# Patient Record
Sex: MALE | Race: ASIAN | NOT HISPANIC OR LATINO | URBAN - METROPOLITAN AREA
[De-identification: names, ages, dates, MRNs, and addresses within clinical notes are randomized per-mention and may not be internally consistent; named-entity substitution may affect disease eponyms.]

---

## 2019-10-13 ENCOUNTER — EMERGENCY (EMERGENCY)
Facility: HOSPITAL | Age: 24
LOS: 1 days | Discharge: HOME | End: 2019-10-13
Attending: EMERGENCY MEDICINE | Admitting: EMERGENCY MEDICINE
Payer: COMMERCIAL

## 2019-10-13 VITALS
RESPIRATION RATE: 20 BRPM | SYSTOLIC BLOOD PRESSURE: 165 MMHG | TEMPERATURE: 97 F | OXYGEN SATURATION: 99 % | DIASTOLIC BLOOD PRESSURE: 97 MMHG | HEART RATE: 111 BPM

## 2019-10-13 DIAGNOSIS — F10.20 ALCOHOL DEPENDENCE, UNCOMPLICATED: ICD-10-CM

## 2019-10-13 LAB
ALBUMIN SERPL ELPH-MCNC: 4.7 G/DL — SIGNIFICANT CHANGE UP (ref 3.3–5)
ALP SERPL-CCNC: 88 U/L — SIGNIFICANT CHANGE UP (ref 40–120)
ALT FLD-CCNC: 16 U/L — SIGNIFICANT CHANGE UP (ref 4–41)
ANION GAP SERPL CALC-SCNC: 18 MMO/L — HIGH (ref 7–14)
APAP SERPL-MCNC: < 15 UG/ML — LOW (ref 15–25)
AST SERPL-CCNC: 20 U/L — SIGNIFICANT CHANGE UP (ref 4–40)
BASOPHILS # BLD AUTO: 0.1 K/UL — SIGNIFICANT CHANGE UP (ref 0–0.2)
BASOPHILS NFR BLD AUTO: 0.7 % — SIGNIFICANT CHANGE UP (ref 0–2)
BILIRUB SERPL-MCNC: 0.2 MG/DL — SIGNIFICANT CHANGE UP (ref 0.2–1.2)
BUN SERPL-MCNC: 11 MG/DL — SIGNIFICANT CHANGE UP (ref 7–23)
CALCIUM SERPL-MCNC: 9.5 MG/DL — SIGNIFICANT CHANGE UP (ref 8.4–10.5)
CHLORIDE SERPL-SCNC: 99 MMOL/L — SIGNIFICANT CHANGE UP (ref 98–107)
CO2 SERPL-SCNC: 21 MMOL/L — LOW (ref 22–31)
CREAT SERPL-MCNC: 0.98 MG/DL — SIGNIFICANT CHANGE UP (ref 0.5–1.3)
EOSINOPHIL # BLD AUTO: 0.31 K/UL — SIGNIFICANT CHANGE UP (ref 0–0.5)
EOSINOPHIL NFR BLD AUTO: 2.1 % — SIGNIFICANT CHANGE UP (ref 0–6)
ETHANOL BLD-MCNC: 90 MG/DL — HIGH
GLUCOSE SERPL-MCNC: 96 MG/DL — SIGNIFICANT CHANGE UP (ref 70–99)
HCT VFR BLD CALC: 49.3 % — SIGNIFICANT CHANGE UP (ref 39–50)
HGB BLD-MCNC: 15.4 G/DL — SIGNIFICANT CHANGE UP (ref 13–17)
IMM GRANULOCYTES NFR BLD AUTO: 0.9 % — SIGNIFICANT CHANGE UP (ref 0–1.5)
LYMPHOCYTES # BLD AUTO: 27.2 % — SIGNIFICANT CHANGE UP (ref 13–44)
LYMPHOCYTES # BLD AUTO: 4.11 K/UL — HIGH (ref 1–3.3)
MCHC RBC-ENTMCNC: 27.1 PG — SIGNIFICANT CHANGE UP (ref 27–34)
MCHC RBC-ENTMCNC: 31.2 % — LOW (ref 32–36)
MCV RBC AUTO: 86.6 FL — SIGNIFICANT CHANGE UP (ref 80–100)
MONOCYTES # BLD AUTO: 0.9 K/UL — SIGNIFICANT CHANGE UP (ref 0–0.9)
MONOCYTES NFR BLD AUTO: 6 % — SIGNIFICANT CHANGE UP (ref 2–14)
NEUTROPHILS # BLD AUTO: 9.54 K/UL — HIGH (ref 1.8–7.4)
NEUTROPHILS NFR BLD AUTO: 63.1 % — SIGNIFICANT CHANGE UP (ref 43–77)
NRBC # FLD: 0 K/UL — SIGNIFICANT CHANGE UP (ref 0–0)
PLATELET # BLD AUTO: 316 K/UL — SIGNIFICANT CHANGE UP (ref 150–400)
PMV BLD: 10.2 FL — SIGNIFICANT CHANGE UP (ref 7–13)
POTASSIUM SERPL-MCNC: 3.8 MMOL/L — SIGNIFICANT CHANGE UP (ref 3.5–5.3)
POTASSIUM SERPL-SCNC: 3.8 MMOL/L — SIGNIFICANT CHANGE UP (ref 3.5–5.3)
PROT SERPL-MCNC: 7.9 G/DL — SIGNIFICANT CHANGE UP (ref 6–8.3)
RBC # BLD: 5.69 M/UL — SIGNIFICANT CHANGE UP (ref 4.2–5.8)
RBC # FLD: 14.1 % — SIGNIFICANT CHANGE UP (ref 10.3–14.5)
SALICYLATES SERPL-MCNC: < 5 MG/DL — LOW (ref 15–30)
SODIUM SERPL-SCNC: 138 MMOL/L — SIGNIFICANT CHANGE UP (ref 135–145)
TSH SERPL-MCNC: 3.21 UIU/ML — SIGNIFICANT CHANGE UP (ref 0.27–4.2)
WBC # BLD: 15.1 K/UL — HIGH (ref 3.8–10.5)
WBC # FLD AUTO: 15.1 K/UL — HIGH (ref 3.8–10.5)

## 2019-10-13 PROCEDURE — 99283 EMERGENCY DEPT VISIT LOW MDM: CPT

## 2019-10-13 PROCEDURE — 90792 PSYCH DIAG EVAL W/MED SRVCS: CPT

## 2019-10-13 NOTE — ED BEHAVIORAL HEALTH NOTE - BEHAVIORAL HEALTH NOTE
Worker met with pt's mother, father, brother, and cousin to obtain collateral.  Worker was joined later in the meeting by Dr. Yaniv Scott.    Pt's family reported the following:    Pt has been abusing oxycodone, cocaine, xanax and alcohol.  Pt was in court-ordered inpatient rehab in [place] from January 2019 through August 2019 for alcohol abuse following a DWI address.  Pt has gone to rehab 5 times in the past. Pt relapsed approximately a month ago.  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  Pt rarely sleeps, and is recording music overnight.  Pt lives with his parents and brother.  Pt has reported racing thoughts and has mentioned trying heroin in the past.  Pt's brother is afraid pt will jump from the household stairs or from a bedroom window while he's abusing drugs or alcohol.  Pt makes verbal threats to his mother.  Pt began abusing drugs about a year ago and he told his cousin he then tried to cut himself in a hotel.  Pt has never been diagnoses with a behavioral health disorder.  When pt is sober, pt asks family for help with feeling sad, racing thoughts, and substance abuse.  Pt told his mother he's, "scared of feeling," and that that's why he uses substances.  Pt's probation period has ended.  Pt needs to return for one more appointment to have his 's license return to him.      Pt exhibits a higher energy level and is talking fast when not on drugs.  Pt has exhibited intense focus on hobbies or career changes throughout his life.  Prior areas of hyperfocus and intense commitment have included Jewish, a real estate company, and currently a music career.  Pt recently told his mother, "mom, I should have a new neuro evaluation because I can't stop the thoughts in my head."  Family history of bi-polar disorder including pt's brother and aunt.  Pt has been spending less time on personal hygiene and continues to display severe agitation.  Pt's brother stated pt's life has gone downhill since he started working on music.  Pt's cousin reports history of sex and shopping impulsivity while pt is abusing substances.  Withdrawal symptoms pt has displayed in the past include trembling and delusions.  January 2019 a psychiatrist started pt on valium and pt and family believe this led to him abusing other drugs.        Worker and MD gave family resource information for [facility] [place] and their outpatient substance abuse program.  Pt's brother asked worker to tell pt that brother will take him to outpatient substance abuse treatment if he is willing to do.  Pt's mother and family are very concerned with pt's safety while he is abusing substances, as well as their own safety while he's abusing substances.  Worker asked pt's family if they filed a police report and/or filed for an order of protection following calling the police who brought the pt in.  Pt's family said they will not file a police report or request an order of protection, despite pt's mother not feeling comfortable with pt returning home upon discharge.      Pt's mother became increasingly agitated and continued to cry as she expressed her concern for her family and her son.  Pt's family asked MD and worker if pt can be "forced" into rehab or detox, or be "signed into" rehab or detox, and worker and MD confirmed that pt was cleared to be released, and that they would speak to him and provide him with resources for outpatient mental health treatment and outpatient substance abuse treatment if he was interested in it.  Pt's mother reiterated her anger that her son was going to be discharged.  Worker asked pt's mother if she would like information on [facility] outpatient clinics and crisis center and she said yes.  Worker provided pt's mother with the information.    MD and worker met with pt who denied any SI, saying he has "way too much to live for."  Pt said his parents called the police because he had an argument with them.  Patient stated, "I have no intention of hurting myself."  Pt said he feels, "fine," and that he's barely home with his parents except for 2 hours a day.  Patient denies any AH, VH, and any feelings of alena.  When MD discussed pt's blood alcohol level with him and asked if the pt believes he has an addiction to alcohol the pt said, "I don't want to speak about that here."      Worker asked pt if he would like information on the [facility] and/or [facility] substance abuse outpatient program and he said no.      Worker was in ED waiting room for another pt's family when worker saw pt leave the ED waiting room, walking past his family and not acknowledging them.  Pt's mother spoke to worker and asked if worker saw that pt exited the ED and wont' speak to his family members and that he's not walking outside on his own.  Pt's mother said if something happens to pt or if he hurts someone it will not, "be her responsibility."  Worker confirmed pt's father has the information on outpatient treatment facilities.  Pt's mother asked worker what they should do if he threatens them or someone else or threatens to hurt himself.  Worker advised pt's mother to call 911 at any time if she feels the pt is a threat to himself or others.

## 2019-10-13 NOTE — ED PROVIDER NOTE - OBJECTIVE STATEMENT
24 M with no PMH brought in by EMS after argument with family.  Patient admits to drinking last night and early am, no other drugs.  Patient denies suicidal thoughts or intent.  No other complaints.

## 2019-10-13 NOTE — ED BEHAVIORAL HEALTH ASSESSMENT NOTE - SUICIDE PROTECTIVE FACTORS
Identifies reasons for living/Has future plans/Engaged in work or school/Positive therapeutic relationships/Cultural, spiritual and/or moral attitudes against suicide/Supportive social network of family or friends/Fear of death or the actual act of killing self/Responsibility to family and others

## 2019-10-13 NOTE — ED ADULT NURSE NOTE - CAS ELECT INFOMATION PROVIDED
Pt's family here to  patient and he walked away from them and said he would like to take bus because they are the reason he was here.. Pt alert and oriented times three and took all belongings with him and discharge instructions./DC instructions

## 2019-10-13 NOTE — ED BEHAVIORAL HEALTH ASSESSMENT NOTE - RISK ASSESSMENT
Low Acute Suicide Risk RISK:  Modifiable risk factors: substance abuse  Unmodifiable risk factors: hx of noncompliance regarding substance abuse treatment (relapsed a month ago), co-morbid substance abuse  Protective factors: Pt is working and brother reported that Pt is not doing poorly in the community (in fact, is engaged in working in the [employer] business), has no history of past psych admission, no history of past SA, .living with family, domiciled, future-oriented, endorses responsibility to family as protective, access to lethal means (like pills) consciously restricted by family (particularly his brother whom he maintains good relationship with), denies (and no objective evidence of) suicidality    Given above, the Pt Pt is at low risk of self-harm.  There is no identifiable acute increase in risk that   would be mitigated by an involuntary psychiatric admission. Patient was offered voluntary substance abuse rehab/detox admission but he declined.

## 2019-10-13 NOTE — ED BEHAVIORAL HEALTH ASSESSMENT NOTE - LEGAL HISTORY
no ongoing pending legal cases; previous hx of drunk driving and was on court mandated rehab program at [place] for 8 months

## 2019-10-13 NOTE — ED PROVIDER NOTE - NSFOLLOWUPCLINICS_GEN_ALL_ED_FT
[facility]  Behavioral Health  [id number] [place] [address]  Phone: [phone]  Fax:   Follow Up Time:

## 2019-10-13 NOTE — ED PROVIDER NOTE - PATIENT PORTAL LINK FT
You can access the FollowMyHealth Patient Portal offered by [facility] by registering at the following website: [email or web address]. By joining [employer]’s FollowMyHealth portal, you will also be able to view your health information using other applications (apps) compatible with our system. You can access the FollowMyHealth Patient Portal offered by [facility] by registering at the following website: [email or web address]. By joining [employer]’s FollowMyHealth portal, you will also be able to view your health information using other applications (apps) compatible with our system.

## 2019-10-13 NOTE — ED BEHAVIORAL HEALTH ASSESSMENT NOTE - VIOLENCE PROTECTIVE FACTORS:
Insight into violence risk and need for management/treatment/Employment stability/Residential stability

## 2019-10-13 NOTE — ED BEHAVIORAL HEALTH ASSESSMENT NOTE - SUMMARY
24/M with no established past psych hx, no prior hx of SA/SIB and hx of alcohol abuse +  other illicit drug abuse (cocaine, THC).  Pt previously underwent a court mandated drug rehab program completed from 1/2/2019 til 08/2019.  Currently, Pt or family denied any pending probationary status.  Today, presented to the ED BIB [place] [employer] in [place] after mother activated 911 due to suicidal thoughts.  The Pt is not under arrest.  At the triage, he reported last drinking at around 0600Hrs.  Since his  ED arrival, the Pt has been calm and cooperative.  There has been no agitation/aggressive behavior.  No verbalization of active/ passive SI/HI.  He has not tested limits.. Has maintained appropriate boundaries.  No signs/symptoms of acute alena or florid psychosis.  He has been easily redirected.  There has been no verbalization of AH/VH.  No signs/symptoms suggestive of withdrawal.      At this time, the Pt is not showing any signs/symptoms suggestive of MDD.  He is not acutely manic or psychotic.  Clinical presentation is consistent with substance induced mood disorder rather than a primary  mood disorder.  Collateral information was obtained from the Pt's brother  who corroborated that Pt had previously verbalized passive SI BUT THIS WAS WITHIN THE CONTEXT OF ACUTE ALCOHOL INTOXICATION.  Brother supports the prospect of Pt pursuing OP substance abuse clinic follow up and is willing to take Pt to all of his scheduled appts once OP psych aftercare follow up has been established.  Brother is ok with taking the Pt home now.     RECOMMENDATIONS:   1. Psychoeducation provided.  Encouraged Pt to see OP psychiatrist of choice for aftercare.  Discussed extensively impact of alcoholism +/- any illicit drug abuse on his health and well being as well as the importance of sobriety.  The Pt and family were provided with community resources including [facility], [facility].   Brother is willing to assume stewardship regarding all of Pt's OP appts   2. Emergency protocol reviewed.  Pt and family (kami. the brother) were adviced to call 911 or come to the nearest ED should symptoms worsen; have increasing bouts of agitation/aggressive behavior; having SI/HI.  3. No psych meds prescribed at this time

## 2019-10-13 NOTE — ED PROVIDER NOTE - PROGRESS NOTE DETAILS
The patient is clinically sober. The patient is alert and oriented x 3, is clear and coherent in conversation and has a normal gait and shows no signs of acute intoxication. The patient is safe for discharge.     Patient continues to deny any SI, will DC to home. Family arrived to ER states that patient did make statements expressing desire to hurt himself and hurt his mother and other family.  Family concerned for their safety and patient's safety, states has had hx of self harm and polysubstance abuse.  Psychiatry consulted and patient to be held pending discharge.

## 2019-10-13 NOTE — ED BEHAVIORAL HEALTH ASSESSMENT NOTE - HPI (INCLUDE ILLNESS QUALITY, SEVERITY, DURATION, TIMING, CONTEXT, MODIFYING FACTORS, ASSOCIATED SIGNS AND SYMPTOMS)
The Pt is a 24 yr old Micronesian-American male, , no children, domiciled and self-employed (owns a real-estate The Pt is a 24 yr old Wallisian-American male, , no children, domiciled and self-employed (owns a real-estate and currently, engaged in the music industry).  the Pt has no established past psych hx, no prior hx of SA/SIB and hx of alcohol abuse +  other illicit drug abuse (cocaine, THC).  Pt previously underwent a court mandated drug rehab program completed from 1/2/2019 til 08/2019.  Currently, Pt or family denied any pending probationary status.  Today, presented to the ED BIB EMS [place] [employer] in [place] after mother activated 911 due to suicidal thoughts.  The Pt is not under arrest.  At the triage, he reported last drinking at around 0600Hrs.      Pt is seen bedside.  He appeared calm and cooperative.  He admits to drinking alcohol this morning and consequently, had an argument with his parents.  When inquiry was made regarding suicidality or homicidality, he adamantly denied harboring any passive/active SI/HI.  He stated "I have no intention to hurt myself".  He reports that currently, he is into music recording.. he admitted drinking alcohol prior to coming to the ED but did not want to elaborate further on how much he consumed.  Whilst at home, he claimed getting into an argument with his parents but again, did not want to elaborate on details pertaining to this argument.  He denied having symptoms suggestive of MDD; denied any specific anxiety disorder symptoms.  Claimed he never had any experience regarding symptoms of hypomania or alena.  Does not feel paranoid nor experienced any perceptual disturbances.  Since his  ED arrival, the Pt has been calm and cooperative.  There has been no agitation/aggressive behavior.  No verbalization of active/ passive SI/HI.  He has not tested limits.. Has maintained appropriate boundaries.  No signs/symptoms of acute alena or florid psychosis.  He has been easily redirected.  There has been no verbalization of AH/VH.  No signs/symptoms suggestive of withdrawal.      Collateral was obtained from his brother, Mr Lei Rubio: who reported that current presentation of the Pt is all related to his alcoholism plus other illicit drug abuse like cocaine, opiate abuse (oxys) and xanax abuse.  brother reported that The Pt is a 24 yr old [place] Islander-American male, , no children, domiciled and self-employed (owns a real-estate and currently, engaged in the music industry).  the Pt has no established past psych hx, no prior hx of SA/SIB and hx of alcohol abuse +  other illicit drug abuse (cocaine, THC).  Pt previously underwent a court mandated drug rehab program completed from 1/2/2019 til 08/2019.  Currently, Pt or family denied any pending probationary status.  Today, presented to the ED BIB EMS [place] [employer] in [place] after mother activated 911 due to suicidal thoughts.  The Pt is not under arrest.  At the triage, he reported last drinking at around 0600Hrs.      Pt is seen bedside.  He appeared calm and cooperative.  He admits to drinking alcohol this morning and consequently, had an argument with his parents.  When inquiry was made regarding suicidality or homicidality, he adamantly denied harboring any passive/active SI/HI.  He stated "I have no intention to hurt myself".  He reports that currently, he is into music recording.. he admitted drinking alcohol prior to coming to the ED but did not want to elaborate further on how much he consumed.  Whilst at home, he claimed getting into an argument with his parents but again, did not want to elaborate on details pertaining to this argument.  He denied having symptoms suggestive of MDD; denied any specific anxiety disorder symptoms.  Claimed he never had any experience regarding symptoms of hypomania or alena.  Does not feel paranoid nor experienced any perceptual disturbances.  Since his  ED arrival, the Pt has been calm and cooperative.  There has been no agitation/aggressive behavior.  No verbalization of active/ passive SI/HI.  He has not tested limits.. Has maintained appropriate boundaries.  No signs/symptoms of acute alena or florid psychosis.  He has been easily redirected.  There has been no verbalization of AH/VH.  No signs/symptoms suggestive of withdrawal.      Collateral was obtained from his brother, Mr Lei Rubio: who reported that current presentation of the Pt is all related to his alcoholism plus other illicit drug abuse like cocaine, opiate abuse (oxys) and xanax abuse.  brother reported that Pt has a "drinking problem" but would not admit to this.  Brother reported that Pt's drinking had escalated which was admixed with unknown drug abuse (though brother suspects that predominantly, Pt is abusing cocaine) after Pt entered into the [employer] industry.  Brother denied that Pt had been exhibiting any signs/symptoms of MDD; no manic or psychotic symptoms.  Brother stated that Pt had previously verbalized passive SI BUT THIS WAS WITHIN THE CONTEXT OF ACUTE ALCOHOL INTOXICATION.  Brother supports the prospect of Pt pursuing OP substance abuse clinic follow up and is willing to take Pt to all of his scheduled appts once OP psych aftercare follow up has been established.  Brother is ok with taking the Pt home now

## 2019-10-13 NOTE — ED BEHAVIORAL HEALTH ASSESSMENT NOTE - REFERRAL / APPOINTMENT DETAILS
walk in Crisis center, [facility]; as per mother, the Pt has a 2:15PM appt tomorrow at [facility]

## 2019-10-13 NOTE — ED BEHAVIORAL HEALTH ASSESSMENT NOTE - SAFETY PLAN ADDT'L DETAILS
No Education provided regarding environmental safety / lethal means restriction/Provision of National Suicide Prevention Lifeline 9-742-407-VARS (0239)/Safety plan discussed with...

## 2019-10-13 NOTE — ED BEHAVIORAL HEALTH ASSESSMENT NOTE - OTHER
NARDA BACK STOP Reference #: [id number]  on 02/17/2019, was prescribed diazepam 10 mg/tab x 60 tabs for 30 days by Dr Hema Gaines brother mother evasive regarding details pertaining his alcohol or drug abuse hx

## 2019-10-13 NOTE — ED BEHAVIORAL HEALTH ASSESSMENT NOTE - DESCRIPTION
Since his  ED arrival, the Pt has been calm and cooperative.  There has been no agitation/aggressive behavior.  No verbalization of active/ passive SI/HI.  He has not tested limits.. Has maintained appropriate boundaries.  No signs/symptoms of acute alena or florid psychosis.  He has been easily redirected.  There has been no verbalization of AH/VH.  No signs/symptoms suggestive of withdrawal.  His BAL was 90    Vital Signs Last 24 Hrs  T(C): 36.3 (13 Oct 2019 09:03), Max: 36.3 (13 Oct 2019 09:03)  T(F): 97.4 (13 Oct 2019 09:03), Max: 97.4 (13 Oct 2019 09:03)  HR: 111 (13 Oct 2019 09:03) (111 - 111)  BP: 165/97 (13 Oct 2019 09:03) (165/97 - 165/97)  BP(mean): --  RR: 20 (13 Oct 2019 09:03) (20 - 20)  SpO2: 99% (13 Oct 2019 09:03) (99% - 99%) none , no children,

## 2019-10-13 NOTE — ED ADULT NURSE NOTE - OBJECTIVE STATEMENT
last drink 6 am pt [place] [employer] in cuffs alert and following direction  suicidal thoughts  Pt brought to  very calm and cooperative. Pt denies suicidal ideations. Pt being evaluated by md. lABS DRAWN AND SENT. Pt ate breakfast and is waiting for results and dispo.  THEODORE Cassidy

## 2019-10-13 NOTE — ED ADULT NURSE NOTE - NSIMPLEMENTINTERV_GEN_ALL_ED
Implemented All Universal Safety Interventions:  [place] to call system. Call bell, personal items and telephone within reach. Instruct patient to call for assistance. Room bathroom lighting operational. Non-slip footwear when patient is off stretcher. Physically safe environment: no spills, clutter or unnecessary equipment. Stretcher in lowest position, wheels locked, appropriate side rails in place.

## 2019-10-14 NOTE — ED BEHAVIORAL HEALTH NOTE - BEHAVIORAL HEALTH NOTE
High Risk Follow up:  Worker called who  ([phone]) states that he should of never been seen psychiatrically at the emergency room.  He denies SI/HI and refuses to follow up with any outpatient care. Worker provided information to [facility] crisis clinic for patient if he changes his mind.

## 2019-11-29 ENCOUNTER — INPATIENT (INPATIENT)
Facility: HOSPITAL | Age: 24
LOS: 1 days | Discharge: HOME | End: 2019-12-01
Attending: HOSPITALIST | Admitting: HOSPITALIST
Payer: COMMERCIAL

## 2019-11-29 VITALS
SYSTOLIC BLOOD PRESSURE: 171 MMHG | HEART RATE: 112 BPM | DIASTOLIC BLOOD PRESSURE: 112 MMHG | OXYGEN SATURATION: 100 % | TEMPERATURE: 98 F | RESPIRATION RATE: 18 BRPM

## 2019-11-29 DIAGNOSIS — R05 COUGH: ICD-10-CM

## 2019-11-29 DIAGNOSIS — Z29.9 ENCOUNTER FOR PROPHYLACTIC MEASURES, UNSPECIFIED: ICD-10-CM

## 2019-11-29 DIAGNOSIS — F10.239 ALCOHOL DEPENDENCE WITH WITHDRAWAL, UNSPECIFIED: ICD-10-CM

## 2019-11-29 DIAGNOSIS — F19.10 OTHER PSYCHOACTIVE SUBSTANCE ABUSE, UNCOMPLICATED: ICD-10-CM

## 2019-11-29 DIAGNOSIS — Z02.9 ENCOUNTER FOR ADMINISTRATIVE EXAMINATIONS, UNSPECIFIED: ICD-10-CM

## 2019-11-29 DIAGNOSIS — D72.829 ELEVATED WHITE BLOOD CELL COUNT, UNSPECIFIED: ICD-10-CM

## 2019-11-29 LAB
ALBUMIN SERPL ELPH-MCNC: 4.2 G/DL — SIGNIFICANT CHANGE UP (ref 3.3–5)
ALP SERPL-CCNC: 101 U/L — SIGNIFICANT CHANGE UP (ref 40–120)
ALT FLD-CCNC: 20 U/L — SIGNIFICANT CHANGE UP (ref 4–41)
AMPHET UR-MCNC: NEGATIVE — SIGNIFICANT CHANGE UP
AMPHET UR-MCNC: NEGATIVE — SIGNIFICANT CHANGE UP
ANION GAP SERPL CALC-SCNC: 15 MMO/L — HIGH (ref 7–14)
APAP SERPL-MCNC: < 15 UG/ML — LOW (ref 15–25)
AST SERPL-CCNC: 30 U/L — SIGNIFICANT CHANGE UP (ref 4–40)
BARBITURATES UR SCN-MCNC: NEGATIVE — SIGNIFICANT CHANGE UP
BARBITURATES UR SCN-MCNC: NEGATIVE — SIGNIFICANT CHANGE UP
BASOPHILS # BLD AUTO: 0.08 K/UL — SIGNIFICANT CHANGE UP (ref 0–0.2)
BASOPHILS NFR BLD AUTO: 0.5 % — SIGNIFICANT CHANGE UP (ref 0–2)
BENZODIAZ UR-MCNC: NEGATIVE — SIGNIFICANT CHANGE UP
BENZODIAZ UR-MCNC: NEGATIVE — SIGNIFICANT CHANGE UP
BILIRUB SERPL-MCNC: 0.4 MG/DL — SIGNIFICANT CHANGE UP (ref 0.2–1.2)
BUN SERPL-MCNC: 7 MG/DL — SIGNIFICANT CHANGE UP (ref 7–23)
CALCIUM SERPL-MCNC: 9.3 MG/DL — SIGNIFICANT CHANGE UP (ref 8.4–10.5)
CANNABINOIDS UR-MCNC: NEGATIVE — SIGNIFICANT CHANGE UP
CANNABINOIDS UR-MCNC: NEGATIVE — SIGNIFICANT CHANGE UP
CHLORIDE SERPL-SCNC: 96 MMOL/L — LOW (ref 98–107)
CO2 SERPL-SCNC: 26 MMOL/L — SIGNIFICANT CHANGE UP (ref 22–31)
COCAINE METAB.OTHER UR-MCNC: NEGATIVE — SIGNIFICANT CHANGE UP
COCAINE METAB.OTHER UR-MCNC: NEGATIVE — SIGNIFICANT CHANGE UP
CREAT SERPL-MCNC: 0.86 MG/DL — SIGNIFICANT CHANGE UP (ref 0.5–1.3)
EOSINOPHIL # BLD AUTO: 0.16 K/UL — SIGNIFICANT CHANGE UP (ref 0–0.5)
EOSINOPHIL NFR BLD AUTO: 1.1 % — SIGNIFICANT CHANGE UP (ref 0–6)
ETHANOL BLD-MCNC: < 10 MG/DL — SIGNIFICANT CHANGE UP
GLUCOSE SERPL-MCNC: 107 MG/DL — HIGH (ref 70–99)
HCT VFR BLD CALC: 48.4 % — SIGNIFICANT CHANGE UP (ref 39–50)
HGB BLD-MCNC: 16.5 G/DL — SIGNIFICANT CHANGE UP (ref 13–17)
IMM GRANULOCYTES NFR BLD AUTO: 0.5 % — SIGNIFICANT CHANGE UP (ref 0–1.5)
LYMPHOCYTES # BLD AUTO: 17.6 % — SIGNIFICANT CHANGE UP (ref 13–44)
LYMPHOCYTES # BLD AUTO: 2.61 K/UL — SIGNIFICANT CHANGE UP (ref 1–3.3)
MAGNESIUM SERPL-MCNC: 1.9 MG/DL — SIGNIFICANT CHANGE UP (ref 1.6–2.6)
MCHC RBC-ENTMCNC: 29.8 PG — SIGNIFICANT CHANGE UP (ref 27–34)
MCHC RBC-ENTMCNC: 34.1 % — SIGNIFICANT CHANGE UP (ref 32–36)
MCV RBC AUTO: 87.4 FL — SIGNIFICANT CHANGE UP (ref 80–100)
METHADONE UR-MCNC: NEGATIVE — SIGNIFICANT CHANGE UP
METHADONE UR-MCNC: NEGATIVE — SIGNIFICANT CHANGE UP
MONOCYTES # BLD AUTO: 1.1 K/UL — HIGH (ref 0–0.9)
MONOCYTES NFR BLD AUTO: 7.4 % — SIGNIFICANT CHANGE UP (ref 2–14)
NEUTROPHILS # BLD AUTO: 10.82 K/UL — HIGH (ref 1.8–7.4)
NEUTROPHILS NFR BLD AUTO: 72.9 % — SIGNIFICANT CHANGE UP (ref 43–77)
NRBC # FLD: 0 K/UL — SIGNIFICANT CHANGE UP (ref 0–0)
OPIATES UR-MCNC: NEGATIVE — SIGNIFICANT CHANGE UP
OPIATES UR-MCNC: NEGATIVE — SIGNIFICANT CHANGE UP
OXYCODONE UR-MCNC: NEGATIVE — SIGNIFICANT CHANGE UP
OXYCODONE UR-MCNC: NEGATIVE — SIGNIFICANT CHANGE UP
PCP UR-MCNC: NEGATIVE — SIGNIFICANT CHANGE UP
PCP UR-MCNC: NEGATIVE — SIGNIFICANT CHANGE UP
PHOSPHATE SERPL-MCNC: 3.3 MG/DL — SIGNIFICANT CHANGE UP (ref 2.5–4.5)
PLATELET # BLD AUTO: 249 K/UL — SIGNIFICANT CHANGE UP (ref 150–400)
PMV BLD: 9.8 FL — SIGNIFICANT CHANGE UP (ref 7–13)
POTASSIUM SERPL-MCNC: 3.6 MMOL/L — SIGNIFICANT CHANGE UP (ref 3.5–5.3)
POTASSIUM SERPL-SCNC: 3.6 MMOL/L — SIGNIFICANT CHANGE UP (ref 3.5–5.3)
PROT SERPL-MCNC: 8.1 G/DL — SIGNIFICANT CHANGE UP (ref 6–8.3)
RBC # BLD: 5.54 M/UL — SIGNIFICANT CHANGE UP (ref 4.2–5.8)
RBC # FLD: 14.7 % — HIGH (ref 10.3–14.5)
SALICYLATES SERPL-MCNC: < 5 MG/DL — LOW (ref 15–30)
SODIUM SERPL-SCNC: 137 MMOL/L — SIGNIFICANT CHANGE UP (ref 135–145)
TROPONIN T, HIGH SENSITIVITY: 7 NG/L — SIGNIFICANT CHANGE UP (ref ?–14)
WBC # BLD: 14.85 K/UL — HIGH (ref 3.8–10.5)
WBC # FLD AUTO: 14.85 K/UL — HIGH (ref 3.8–10.5)

## 2019-11-29 PROCEDURE — 99223 1ST HOSP IP/OBS HIGH 75: CPT | Mod: GC

## 2019-11-29 PROCEDURE — 71045 X-RAY EXAM CHEST 1 VIEW: CPT | Mod: 26

## 2019-11-29 RX ORDER — FOLIC ACID 0.8 MG
1 TABLET ORAL DAILY
Refills: 0 | Status: DISCONTINUED | OUTPATIENT
Start: 2019-11-29 | End: 2019-12-01

## 2019-11-29 RX ORDER — INFLUENZA VIRUS VACCINE 15; 15; 15; 15 UG/.5ML; UG/.5ML; UG/.5ML; UG/.5ML
0.5 SUSPENSION INTRAMUSCULAR ONCE
Refills: 0 | Status: DISCONTINUED | OUTPATIENT
Start: 2019-11-29 | End: 2019-12-01

## 2019-11-29 RX ORDER — NICOTINE POLACRILEX 2 MG
1 GUM BUCCAL THREE TIMES A DAY
Refills: 0 | Status: DISCONTINUED | OUTPATIENT
Start: 2019-11-29 | End: 2019-11-30

## 2019-11-29 RX ORDER — NICOTINE POLACRILEX 2 MG
2 GUM BUCCAL EVERY 4 HOURS
Refills: 0 | Status: DISCONTINUED | OUTPATIENT
Start: 2019-11-29 | End: 2019-11-30

## 2019-11-29 RX ORDER — ONDANSETRON 8 MG/1
4 TABLET, FILM COATED ORAL EVERY 6 HOURS
Refills: 0 | Status: DISCONTINUED | OUTPATIENT
Start: 2019-11-29 | End: 2019-12-01

## 2019-11-29 RX ORDER — THIAMINE MONONITRATE (VIT B1) 100 MG
100 TABLET ORAL DAILY
Refills: 0 | Status: COMPLETED | OUTPATIENT
Start: 2019-11-29 | End: 2019-12-01

## 2019-11-29 RX ORDER — NICOTINE POLACRILEX 2 MG
1 GUM BUCCAL DAILY
Refills: 0 | Status: DISCONTINUED | OUTPATIENT
Start: 2019-11-29 | End: 2019-12-01

## 2019-11-29 RX ORDER — SODIUM CHLORIDE 9 MG/ML
1000 INJECTION, SOLUTION INTRAVENOUS
Refills: 0 | Status: DISCONTINUED | OUTPATIENT
Start: 2019-11-29 | End: 2019-11-29

## 2019-11-29 RX ADMIN — Medication 1 PATCH: at 12:24

## 2019-11-29 RX ADMIN — ONDANSETRON 4 MILLIGRAM(S): 8 TABLET, FILM COATED ORAL at 15:47

## 2019-11-29 RX ADMIN — Medication 1 PATCH: at 20:53

## 2019-11-29 RX ADMIN — Medication 2 MILLIGRAM(S): at 04:48

## 2019-11-29 RX ADMIN — Medication 1 MILLIGRAM(S): at 15:23

## 2019-11-29 RX ADMIN — Medication 2 MILLIGRAM(S): at 20:54

## 2019-11-29 RX ADMIN — SODIUM CHLORIDE 150 MILLILITER(S): 9 INJECTION, SOLUTION INTRAVENOUS at 04:54

## 2019-11-29 RX ADMIN — Medication 2 MILLIGRAM(S): at 15:32

## 2019-11-29 RX ADMIN — Medication 1 TABLET(S): at 15:23

## 2019-11-29 RX ADMIN — Medication 100 MILLIGRAM(S): at 15:24

## 2019-11-29 RX ADMIN — Medication 2 MILLIGRAM(S): at 12:23

## 2019-11-29 NOTE — ED ADULT NURSE NOTE - CHIEF COMPLAINT QUOTE
Pt. reports drinking "a lot" of whiskey. Last drink around 9 pm. Tremors noted to bilateral arms, pt. states they began 1 hour ago. Also endorses nausea. Denies use of drugs, headache, Auditory/Visual hallucinations, suicide ideation, homicide ideation, withdrawal seizure, palpitations, vomiting. A&Ox3 and ambulatory with steady gait at present. Calm and cooperative at present. PMH: ETOH.

## 2019-11-29 NOTE — H&P ADULT - PROBLEM SELECTOR PLAN 4
- DVT prophylaxis: None as patient is ambulatory  - Diet: Regular, no pork  - Disposition: Pending, most likely to home - DVT prophylaxis: None as patient is ambulatory  - Diet: Regular, no pork  - Disposition: Pending, most likely to home  - Health maintainence: Patient amenable to Hepatitis and HIV testing - States that he has had a cough for approximately 1 year which he states has gotten worse, suspect chronic bronchitis related to smoking. Will need cancer screening with CT chest as outpt. Smoking cessation advised  - Productive of white sputum, no recent hemoptysis  - No respiratory distress or accessory muscle use per physical examination  - CXR to evaluate for intrapulmonary pathology

## 2019-11-29 NOTE — H&P ADULT - NSHPPHYSICALEXAM_GEN_ALL_CORE
Vital Signs Last 24 Hrs  T(C): 36.6 (29 Nov 2019 09:55), Max: 36.9 (29 Nov 2019 03:24)  T(F): 97.9 (29 Nov 2019 09:55), Max: 98.4 (29 Nov 2019 03:24)  HR: 96 (29 Nov 2019 09:55) (96 - 112)  BP: 155/80 (29 Nov 2019 09:55) (151/86 - 171/112)  BP(mean): --  RR: 18 (29 Nov 2019 09:55) (17 - 18)  SpO2: 98% (29 Nov 2019 09:55) (98% - 100%)    CONSTITUTIONAL: No acute distress. Tired but awake and alert, actively engages in discussion  HEAD: No evidence of trauma. Structures WNL.  EYES: +PERRL. +EOMI. No scleral icterus. No conjunctival injection.  ENT: Moist oral mucosa. No erythema. No pharyngeal exudates.   NECK: Supple. Appropriate ROM. No stiffness. No masses or lymphadenopathy. Tattoo on posterior neck.  RESPIRATORY: CTAB. No wheezes, rales, or rhonchi. No accessory muscle use. No apparent respiratory distress.  CHEST: Tattoo in center of chest; erythema noted on left upper chest with slight bleeding.   CARDIOVASCULAR: +S1/S2. No audible S3/S4. Rate near upper limit of normal. No murmurs, rubs, or gallops. 2+ radial pulses x b/l UE; 2+ DP pulses x b/l LE.   GASTROINTESTINAL: Soft, nontender, nondistended. +BS. No rebound or guarding.   BACK: No CVA tenderness.  EXTREMITY: No LE swelling or edema. EXTs warm to touch.  MUSCULOSKELETAL: Movement evident in all limbs. No tenderness on palpation.  DERMATOLOGICAL: No abnormal rashes or lesions.  NEUROLOGICAL: CN 2-12 grossly intact. No focal deficits. Sensation intact x 4EXT. A&Ox3 (oriented to person, place, and time).  PSYCHIATRIC: Appropriate affect. Vital Signs Last 24 Hrs  T(C): 36.6 (29 Nov 2019 09:55), Max: 36.9 (29 Nov 2019 03:24)  T(F): 97.9 (29 Nov 2019 09:55), Max: 98.4 (29 Nov 2019 03:24)  HR: 96 (29 Nov 2019 09:55) (96 - 112)  BP: 155/80 (29 Nov 2019 09:55) (151/86 - 171/112)  BP(mean): --  RR: 18 (29 Nov 2019 09:55) (17 - 18)  SpO2: 98% (29 Nov 2019 09:55) (98% - 100%)    CONSTITUTIONAL: No acute distress. Tired but awake and alert, actively engages in discussion  HEAD: No evidence of trauma. Structures WNL.  EYES: +PERRL. +EOMI. No scleral icterus. No conjunctival injection.  ENT: Moist oral mucosa. No erythema. No pharyngeal exudates.   NECK: Supple. Appropriate ROM. No stiffness. No masses or lymphadenopathy. Tattoo on posterior neck.  RESPIRATORY: CTAB. No wheezes, rales, or rhonchi. No accessory muscle use. No apparent respiratory distress.  CHEST: Tattoo in center of chest; erythema noted on left upper chest with slight bleeding.   CARDIOVASCULAR: +S1/S2. No audible S3/S4. Rate near upper limit of normal. No murmurs, rubs, or gallops. 2+ radial pulses x b/l UE; 2+ DP pulses x b/l LE.   GASTROINTESTINAL: Soft, nontender, nondistended. +BS. No rebound or guarding.   BACK: Diffuse acne in different stages of healing. No CVA tenderness.  GENITOURINARY: Foreskin present. No bumps, erythema, or other lesions present.   EXTREMITY: No LE swelling or edema. EXTs warm to touch.  MUSCULOSKELETAL: Movement evident in all limbs. No tenderness on palpation.  DERMATOLOGICAL: Tattoos on face, posterior neck, chest.  NEUROLOGICAL: CN 2-12 grossly intact. No focal deficits. Sensation intact x 4EXT. A&Ox3 (oriented to person, place, and time).

## 2019-11-29 NOTE — ED PROVIDER NOTE - PHYSICAL EXAMINATION
tremors of hands b/l Gen: AAOx3, non-toxic  Head: NCAT  HEENT: EOMI, PERRLA, oral mucosa moist, normal conjunctiva  Lung: CTAB, no respiratory distress, no wheezes/rhonchi/rales B/L, speaking in full sentences  CV: tachycardic, no murmurs, rubs or gallops  Abd: soft, NTND, no guarding, no CVA tenderness, no rebound tenderness  MSK: no visible deformities, full range of motion of all 4 exts  Neuro: No focal sensory or motor deficits, tremors of hands b/l, no tongue fasciculations  Skin: Warm, well perfused, no rash  Psych: anxious.   ~Reji Thornton MD

## 2019-11-29 NOTE — H&P ADULT - PROBLEM SELECTOR PLAN 2
- Patient admits addiction to cocaine (last use 1 week ago), alcohol (1 bottle of whiskey daily x 4 years, increased to 2-3 bottles daily within last 2 weeks); mixes alcohol with Xanax and Valium (last mix 1 month ago)  - SBIRT consult to elucidate alcohol use  - Social work consult to discuss treatment options for substance abuse

## 2019-11-29 NOTE — ED PROVIDER NOTE - NS ED ROS FT
GENERAL: No fever or chills, EYES: no change in vision, HEENT: no trouble speaking, CARDIAC: + chest pain, - palpitation PULMONARY: no cough or SOB, GI: no abdominal pain, + nausea, + vomiting, no diarrhea or constipation, : No changes in urination, SKIN: no rashes, NEURO: no headache,  MSK: + tremors, No muscle pain ~Reji Thornton MD

## 2019-11-29 NOTE — ED PROVIDER NOTE - OBJECTIVE STATEMENT
Reji Thornton MD: 23 yo M PMH substance abuse (Cocaine, Pills and EtOH) p/w tremor of his hands b/l. Pt states that she has been drinking heavy for the past week about 1.5 L of whisker per day. Pt report that his last drink was 7 hrs ago. Pt Reji Thornton MD: 25 yo M PMH substance abuse (Cocaine, Pills and EtOH) p/w tremor of his hands b/l. Pt states that she has been drinking heavy for the past week about 1.5 L of whisker per day. Pt report that his last drink was 7 hrs ago. Pt state that  2 hr ago he was unable to fall asleep becoming restless with n/v and tremors. Pt states that he has had Etoh withdrawal in the past but never had a seizure. Pt also report last using cocaine or any other substances in greater than 1 week. Report brief episode of nonexertional CP in triage.

## 2019-11-29 NOTE — H&P ADULT - NSHPSOCIALHISTORY_GEN_ALL_CORE
Lives by self    Smoking: Smokes 2 packs a day  Rc Lives by self but states that girlfriend and parents live nearby    States that he is a  that recently got a record deal     Smoking: Started smoking at 16, smokes 2 packs a day   Alcohol use: 1 bottle of whiskey daily; within last 2 weeks, increased to 2-3 bottles daily  Recreational drug use: Cocaine (last used last week), Xanax/Valium with EtOH use (has not mixed in a month) Lives by self but states that girlfriend and parents live nearby    States that he is a  that recently got a record deal     Smoking: Started smoking at 16, smokes 2 packs a day   Alcohol use: 1 bottle of whiskey daily; within last 2 weeks, increased to 2-3 bottles daily  Recreational drug use: Cocaine (last used last week), Xanax/Valium with EtOH use (has not mixed in a month)    Sexual history: Recently got back together with current girlfriend after 4 months; during this period of separation, patient admits to multiple partners daily with minimal barrier protection

## 2019-11-29 NOTE — H&P ADULT - PROBLEM SELECTOR PLAN 1
- P/w b/l UE tremors in the setting of heavy alcohol consumption which has increased within the last 2 weeks due to employment-related stress  - Patient states most recent drink was 9pm on night before admission  - Admits mixing alcohol with Valium and Xanax, though states he has not done so in approximately 1 month  - CIWA scores 5 and 5 thus far; c/w CIWA protocol, lorazepam taper due to high risk for withdrawal given history of heavy alcohol consumption and mixing with benzos  - Folic acid, thiamine, multivitamin  - Monitor for overt signs of withdrawal - P/w b/l UE tremors in the setting of heavy alcohol consumption which has increased within the last 2 weeks due to employment-related stress  - Patient states most recent drink was 9pm on night before admission  - Admits mixing alcohol with Valium and Xanax, though states he has not done so in approximately 1 month  - EKG - sinus tachycardia (113)  - WBC 14.85 - likely reactive leukocytosis in the setting of withdrawal; less likely infection given absence of overt clinical signs of infection  - CIWA scores 5 and 5 thus far; c/w CIWA protocol, lorazepam taper due to high risk for withdrawal given history of heavy alcohol consumption and mixing with benzos  - Folic acid, thiamine, multivitamin  - Monitor for overt signs of withdrawal

## 2019-11-29 NOTE — H&P ADULT - PROBLEM SELECTOR PLAN 3
- DVT prophylaxis: None as patient is ambulatory  - Diet: Regular, no pork  - Disposition: Pending, most likely to home - States that he has had a cough for approximately 1 year which he states has gotten worse  - Productive of white sputum, no hemoptysis - States that he has had a cough for approximately 1 year which he states has gotten worse  - Productive of white sputum, no recent hemoptysis  - No respiratory distress or accessory muscle use per physical examination  - CXR to evaluate for intrapulmonary pathology likely reactive. No fevers  F/U CXR  monitor off Abx for now. Send Bcx, UA, Ucx if febrile

## 2019-11-29 NOTE — ED ADULT NURSE REASSESSMENT NOTE - NS ED NURSE REASSESS COMMENT FT1
Pt Aox3 admitted at this time with bed assignment 746b. Pt Aox3 requesting to leave AMA upon transports arrival for transport to pt room. Pt educated on risks of leaving AMA by ED MD Mendoza. After discussion pt reports willing to stay and OK for floor. Transport at bedside at this time to take pt to floor with significant other and family at bedside. Pt respirations even and unlabored b/l. Pt appears in NAD, CIWA as charted and report given to floor RN, pt for transport at this time.

## 2019-11-29 NOTE — H&P ADULT - PROBLEM SELECTOR PLAN 5
Transitions of Care Status:  1.  Name of PCP:  2.  PCP Contacted on Admission: [ ] Y    [ ] N    3.  PCP contacted at Discharge: [ ] Y    [ ] N    [ ] N/A  4.  Post-Discharge Appointment Date and Location:  5.  Summary of Handoff given to PCP: - DVT prophylaxis: None as patient is ambulatory  - Diet: Regular, no pork  - Disposition: Pending, most likely to home  - Health maintainence: Patient amenable to Hepatitis and HIV testing

## 2019-11-29 NOTE — ED ADULT TRIAGE NOTE - CHIEF COMPLAINT QUOTE
Pt. reports drinking "a lot" of whiskey. Last drink around 9 pm. Tremors noted to bilateral arms, pt. states they began 1 hour ago. Denies use of drugs, headache, Auditory/Visual hallucinations, suicide ideation, homicide ideation, withdrawal seizure, palpitations. A&Ox3 and ambulatory with steady gait at present. PMH: ETOH Pt. reports drinking "a lot" of whiskey. Last drink around 9 pm. Tremors noted to bilateral arms, pt. states they began 1 hour ago. Also endorses nausea. Denies use of drugs, headache, Auditory/Visual hallucinations, suicide ideation, homicide ideation, withdrawal seizure, palpitations, vomiting. A&Ox3 and ambulatory with steady gait at present. Calm and cooperative at present. PMH: ETOH Pt. reports drinking "a lot" of whiskey. Last drink around 9 pm. Tremors noted to bilateral arms, pt. states they began 1 hour ago. Also endorses nausea. Denies use of drugs, headache, Auditory/Visual hallucinations, suicide ideation, homicide ideation, withdrawal seizure, palpitations, vomiting. A&Ox3 and ambulatory with steady gait at present. Calm and cooperative at present. PMH: ETOH.

## 2019-11-29 NOTE — ED PROVIDER NOTE - CLINICAL SUMMARY MEDICAL DECISION MAKING FREE TEXT BOX
Reji Thornton MD: 23 yo M PMH substance abuse (Cocaine, Pills and EtOH) p/w tremor of his hands b/l. EtOH withdrawal ekg labs tox screen. Will trpo due to pt c/o of CP Reji Thornton MD: 25 yo M PMH substance abuse (Cocaine, Pills and EtOH) p/w tremor of his hands b/l. EtOH withdrawal ekg labs tox screen. Will trop due to pt c/o of CP

## 2019-11-29 NOTE — H&P ADULT - ASSESSMENT
24M with admitted polysubstance abuse hx (cocaine, alcohol, Xanax/Valium) presents with b/u UE tremors concerning for alcohol withdrawal, currently on CIWA protocol.

## 2019-11-29 NOTE — ED PROVIDER NOTE - ATTENDING CONTRIBUTION TO CARE
MD Godwin:  I performed a face to face bedside interview with patient regarding history of present illness, review of symptoms and past medical history. I completed an independent physical exam(documented below).  I have discussed patient's plan of care with resident.   I agree with note as stated above, having amended the EMR as needed to reflect my findings. I have discussed the assessment and plan of care.  This includes during the time I functioned as the attending physician for this patient.  PE:  Gen: Alert, NAD  Head: NC, AT,  EOMI, normal lids/conjunctiva  ENT:  normal hearing, patent oropharynx without erythema/exudate  Neck: +supple, no tenderness/meningismus/JVD, +Trachea midline  Chest: no chest wall tenderness, equal chest rise  Pulm: Bilateral BS, normal resp effort, no wheeze/stridor/retractions  CV: RRR, no M/R/G, +dist pulses  Abd: +BS, soft, NT/ND  Rectal: deferred  Mskel: no edema/erythema/cyanosis  Skin: no rash  Neuro: AAOx3  MDM:  25yo M, pmh of polysubstance abuse and etoh w/d (denies DTs), p/w b/l hand tremor after going on a drinking pham for the last week, last drink 7hours ago. Also reports having used cocaine within the last week or two. +hand tremors; no tongue fasciculations, but anxious appearing, hypertensive and tachycardic.  labs, fluids, benzos, admit for etoh w/d.

## 2019-11-29 NOTE — H&P ADULT - HISTORY OF PRESENT ILLNESS
24M w/no PMHx presents with upper extremity tremors. Patient admits to     Hospitalized before for similar symptoms.     Pills- valium, xanax with alcohol, cocaine, oxycontin (but never got into it), no injection use.    Alcohol - starting drinking daily for 4 years. Last drink was 9pm last night. Started feeling jittery early in the morning.    Denies feeling jittery at time of the interview. Denies tactile hallucinations.    No surgery no allergies, no medications    Does not eat pork    Lives by self    Has friends, parents, girlfriend who live close by    Patient states that he does not want to be dependent but he does not want to do inpatient rehab 24M w/no PMHx presents with upper extremity tremors.     Hospitalized before for similar symptoms.     Pills- valium, xanax with alcohol, cocaine, oxycontin (but never got into it), no injection use.    Alcohol - starting drinking daily for 4 years. Last drink was 9pm last night. Started feeling jittery early in the morning.    Denies feeling jittery at time of the interview. Denies tactile hallucinations.    No surgery no allergies, no medications    Does not eat pork    Lives by self    Has friends, parents, girlfriend who live close by    Patient states that he does not want to be dependent but he does not want to do inpatient rehab 24M PMHx polysubstance abuse presents with upper extremity tremors. 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 He states that his cocaine use was last week and he has not mixed Xanax/Valium with alcohol for about a month. His last alcohol intake was 9pm on day before presentation. He states that he had significant tremors in his upper extremities but initially refused to come to the hospital; he was brought in by his mother and girlfriend. The patient endorses nausea, vomiting, fatigue, and a chronic cough that he states has gotten worse recently. He denies tactile/visual/auditory hallucinations, palpitations, SOB, fever, lightheadedness, dizziness, ABD pain. Of note, the patient states that he participated in outpatient rehab earlier this year but went back to drinking afterwards. He expresses a desire to break his dependence and is amenable to outpatient rehab.     In the ED: /112, , RR 18, O2 Sat 100, T98.4. WBC 14.85, CBC otherwise WNL. CMP WNL. Urine toxicology negative. EKG - sinus tachycardia @ 113, QTc 441. CIWA scores 5 and 5. 24M PMHx polysubstance abuse presents with upper extremity tremors. 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 He states that his cocaine use was last week and he has not mixed Xanax/Valium with alcohol for about a month. His last alcohol intake was 9pm on day before presentation. He states that he had significant tremors in his upper extremities but initially refused to come to the hospital; he was brought in by his mother and girlfriend. The patient endorses nausea, vomiting, fatigue, and a chronic cough that he states has gotten worse recently. He denies tactile/visual/auditory hallucinations, palpitations, SOB, fever, lightheadedness, dizziness, ABD pain. Of note, the patient states that he participated in outpatient rehab earlier this year but went back to drinking afterwards. He expresses a desire to break his dependence and is amenable to outpatient rehab.     In the ED: /112, , RR 18, O2 Sat 100, T98.4. WBC 14.85, CBC otherwise WNL. CMP WNL. Urine toxicology negative. EKG - sinus tachycardia @ 113, QTc 441. CIWA scores 5 and 5. Given NS + multivitamin/thiamine/folic acid additives, lorazepam 2mg x 1.

## 2019-11-29 NOTE — H&P ADULT - NSHPLABSRESULTS_GEN_ALL_CORE
16.5   14.85 )-----------( 249      ( 29 Nov 2019 04:40 )             48.4     11-29    137  |  96<L>  |  7   ----------------------------<  107<H>  3.6   |  26  |  0.86    Ca    9.3      29 Nov 2019 04:40  Phos  3.3     11-29  Mg     1.9     11-29    TPro  8.1  /  Alb  4.2  /  TBili  0.4  /  DBili  x   /  AST  30  /  ALT  20  /  AlkPhos  101  11-29 16.5   14.85 )-----------( 249      ( 29 Nov 2019 04:40 )             48.4     11-29    137  |  96<L>  |  7   ----------------------------<  107<H>  3.6   |  26  |  0.86    Ca    9.3      29 Nov 2019 04:40  Phos  3.3     11-29  Mg     1.9     11-29    TPro  8.1  /  Alb  4.2  /  TBili  0.4  /  DBili  x   /  AST  30  /  ALT  20  /  AlkPhos  101  11-29    EKG personally reviewed: sinus tach

## 2019-11-29 NOTE — ED ADULT NURSE NOTE - OBJECTIVE STATEMENT
Pt received in Tr C, 24Y M AOx3, ambulatory c/o tremors. Hx ETOH Abuse and withdrawals. Pt reports drinks 1-1.5 pints of vodka daily and was increasingly drinking the past week with last drink last night. Pt endorsing visible hand tremors when b/l arms extended and nausea with x1 episode of vomiting. pt denies any hearing/vision changes or HA. Pt respirations even and unlabored b/l. Pt VS as charted, IV 18G Placed Left AC, labs sent, medicated as ordered, family at bedside, appears in NAD, will continue to monitor. Pt received in Tr C, 24Y M AOx3, ambulatory c/o tremors. Hx ETOH/ Substance Abuse and withdrawals. Pt reports drinks 1-1.5 pints of vodka daily and was increasingly drinking the past week with last drink last night. Pt endorsing visible hand tremors when b/l arms extended and nausea with x1 episode of vomiting. pt denies any hearing/vision changes or HA. Pt respirations even and unlabored b/l. Pt VS as charted, IV 18G Placed Left AC, labs sent, medicated as ordered, family at bedside, appears in NAD, will continue to monitor.

## 2019-11-30 LAB
ANION GAP SERPL CALC-SCNC: 14 MMO/L — SIGNIFICANT CHANGE UP (ref 7–14)
BUN SERPL-MCNC: 9 MG/DL — SIGNIFICANT CHANGE UP (ref 7–23)
CALCIUM SERPL-MCNC: 9.1 MG/DL — SIGNIFICANT CHANGE UP (ref 8.4–10.5)
CHLORIDE SERPL-SCNC: 101 MMOL/L — SIGNIFICANT CHANGE UP (ref 98–107)
CO2 SERPL-SCNC: 21 MMOL/L — LOW (ref 22–31)
CREAT SERPL-MCNC: 0.76 MG/DL — SIGNIFICANT CHANGE UP (ref 0.5–1.3)
GLUCOSE SERPL-MCNC: 104 MG/DL — HIGH (ref 70–99)
HCT VFR BLD CALC: 51.5 % — HIGH (ref 39–50)
HGB BLD-MCNC: 16.4 G/DL — SIGNIFICANT CHANGE UP (ref 13–17)
HIV 1+2 AB+HIV1 P24 AG SERPL QL IA: SIGNIFICANT CHANGE UP
MAGNESIUM SERPL-MCNC: 2.4 MG/DL — SIGNIFICANT CHANGE UP (ref 1.6–2.6)
MCHC RBC-ENTMCNC: 28.9 PG — SIGNIFICANT CHANGE UP (ref 27–34)
MCHC RBC-ENTMCNC: 31.8 % — LOW (ref 32–36)
MCV RBC AUTO: 90.8 FL — SIGNIFICANT CHANGE UP (ref 80–100)
NRBC # FLD: 0 K/UL — SIGNIFICANT CHANGE UP (ref 0–0)
PHOSPHATE SERPL-MCNC: 4.6 MG/DL — HIGH (ref 2.5–4.5)
PLATELET # BLD AUTO: 229 K/UL — SIGNIFICANT CHANGE UP (ref 150–400)
PMV BLD: 10 FL — SIGNIFICANT CHANGE UP (ref 7–13)
POTASSIUM SERPL-MCNC: 3.9 MMOL/L — SIGNIFICANT CHANGE UP (ref 3.5–5.3)
POTASSIUM SERPL-SCNC: 3.9 MMOL/L — SIGNIFICANT CHANGE UP (ref 3.5–5.3)
RBC # BLD: 5.67 M/UL — SIGNIFICANT CHANGE UP (ref 4.2–5.8)
RBC # FLD: 14.8 % — HIGH (ref 10.3–14.5)
SODIUM SERPL-SCNC: 136 MMOL/L — SIGNIFICANT CHANGE UP (ref 135–145)
WBC # BLD: 13.57 K/UL — HIGH (ref 3.8–10.5)
WBC # FLD AUTO: 13.57 K/UL — HIGH (ref 3.8–10.5)

## 2019-11-30 PROCEDURE — 99232 SBSQ HOSP IP/OBS MODERATE 35: CPT | Mod: GC

## 2019-11-30 RX ORDER — NICOTINE POLACRILEX 2 MG
1 GUM BUCCAL
Refills: 0 | Status: DISCONTINUED | OUTPATIENT
Start: 2019-11-30 | End: 2019-12-01

## 2019-11-30 RX ORDER — NICOTINE POLACRILEX 2 MG
2 GUM BUCCAL ONCE
Refills: 0 | Status: COMPLETED | OUTPATIENT
Start: 2019-11-30 | End: 2019-11-30

## 2019-11-30 RX ADMIN — Medication 1 PATCH: at 11:54

## 2019-11-30 RX ADMIN — Medication 100 MILLIGRAM(S): at 11:55

## 2019-11-30 RX ADMIN — Medication 1 EACH: at 01:37

## 2019-11-30 RX ADMIN — ONDANSETRON 4 MILLIGRAM(S): 8 TABLET, FILM COATED ORAL at 17:45

## 2019-11-30 RX ADMIN — Medication 1 TABLET(S): at 11:55

## 2019-11-30 RX ADMIN — Medication 1 PATCH: at 19:33

## 2019-11-30 RX ADMIN — Medication 2 MILLIGRAM(S): at 06:27

## 2019-11-30 RX ADMIN — Medication 2 MILLIGRAM(S): at 01:37

## 2019-11-30 RX ADMIN — Medication 1 MILLIGRAM(S): at 11:55

## 2019-11-30 RX ADMIN — Medication 2 MILLIGRAM(S): at 23:50

## 2019-11-30 RX ADMIN — Medication 2 MILLIGRAM(S): at 17:55

## 2019-11-30 RX ADMIN — Medication 2 MILLIGRAM(S): at 10:27

## 2019-11-30 RX ADMIN — Medication 1 EACH: at 10:27

## 2019-11-30 RX ADMIN — Medication 1.5 MILLIGRAM(S): at 14:47

## 2019-11-30 NOTE — PROGRESS NOTE ADULT - PROBLEM SELECTOR PLAN 1
- P/w b/l UE tremors in the setting of heavy alcohol consumption which has increased within the last 2 weeks due to employment-related stress  - Patient states most recent drink was 9pm on night before admission  - Admits mixing alcohol with Valium and Xanax, though states he has not done so in approximately 1 month  - EKG - sinus tachycardia (113)  - WBC 14.85 - likely reactive leukocytosis in the setting of withdrawal; less likely infection given absence of overt clinical signs of infection  - CIWA scores 5 and 5 thus far; c/w CIWA protocol, lorazepam taper due to high risk for withdrawal given history of heavy alcohol consumption and mixing with benzos  - Folic acid, thiamine, multivitamin  - Monitor for overt signs of withdrawal - P/w b/l UE tremors in the setting of heavy alcohol consumption which has increased within the last 2 weeks due to employment-related stress  - Patient states most recent drink was 9pm on night before admission  - Admits mixing alcohol with Valium and Xanax, though states he has not done so in approximately 1 month  - EKG - sinus tachycardia (113)  - C/w CIWA protocol, lorazepam taper day 2; CIWA scores 3 --> 0  - Folic acid, thiamine, multivitamin  - Monitor for overt signs of withdrawal

## 2019-11-30 NOTE — PROVIDER CONTACT NOTE (OTHER) - ASSESSMENT
patient AxOx4, patient removed right deltoid nicotine patch, patient states "it doesn't work" and was requesting nicotine inhaler, states works better. patient educated on importance of medication of nicotine patch, patient still refusing/removed. CIWA protocol maintained as ordered. safety maintained and will continue to monitor

## 2019-11-30 NOTE — PROVIDER CONTACT NOTE (OTHER) - ASSESSMENT
Patient complains of cravings for drug usage. Nicotine patch in place and received prn nicotine inhalation frequency for the day.

## 2019-11-30 NOTE — PROGRESS NOTE ADULT - SUBJECTIVE AND OBJECTIVE BOX
Contact Information:  Liliya Woodward II, MD, MPH  PGY-1, Internal Medicine  Pager: [phone] ([facility]) /// [phone] ([facility])    IZABELLA COWAN, [id number]    Patient is a 24y old  Male who presents with a chief complaint of Alcohol withdrawal (29 Nov 2019 07:52)      OVERNIGHT EVENTS:    SUBJECTIVE:    CONSTITUTIONAL: No weakness. No fatigue. No fever.  HEAD: No head trauma.   EYES: No vision changes.  ENT: No hearing changes or tinnitus. No ear pain. No changes in smell. No nasal congestion or discharge. No sore throat. No voice hoarseness.   NECK: No neck pain or stiffness. No lumps.  RESPIRATORY: No cough. No SOB. No wheezing. No hemoptysis.   CARDIOVASCULAR: No chest pain. No palpitations.   GASTROINTESTINAL: No dysphagia. No ABD pain. No distension. No constipation. No diarrhea. No pain with defecation. No hematemesis. No hematochezia or melena.  BACK: No back pain.  GENITOURINARY: No dysuria. No frequency or urgency. No hesitancy. No incontinence. No urinary retention. No suprapubic pain. No hematuria.  EXTREMITY: No swelling.  MUSCULOSKELETAL: No joint pain or swelling. No fractures. No stiffness.    SKIN: No rashes. No itching. No skin, hair, or nail changes.  NEUROLOGICAL: No weakness or paralysis. No lightheadedness or dizziness. No HA. No numbness or tingling.   PSYCHIATRIC: No depression.       OBJECTIVE:  Vital Signs Last 24 Hrs  T(C): 36.9 (30 Nov 2019 01:31), Max: 36.9 (30 Nov 2019 01:31)  T(F): 98.5 (30 Nov 2019 01:31), Max: 98.5 (30 Nov 2019 01:31)  HR: 108 (30 Nov 2019 01:36) (92 - 116)  BP: 152/100 (30 Nov 2019 01:31) (151/86 - 160/100)  BP(mean): --  RR: 16 (30 Nov 2019 01:31) (16 - 18)  SpO2: 99% (30 Nov 2019 01:31) (98% - 100%)  I&O's Summary      MEDICATIONS  (STANDING):  folic acid 1 milliGRAM(s) Oral daily  influenza   Vaccine 0.5 milliLiter(s) IntraMuscular once  LORazepam     Tablet   Oral   LORazepam     Tablet 2 milliGRAM(s) Oral every 4 hours  LORazepam     Tablet 1.5 milliGRAM(s) Oral every 4 hours  multivitamin 1 Tablet(s) Oral daily  nicotine - 21 mG/24Hr(s) Patch 1 patch Transdermal daily  thiamine 100 milliGRAM(s) Oral daily    MEDICATIONS  (PRN):  LORazepam     Tablet 2 milliGRAM(s) Oral every 2 hours PRN Symptom-triggered 2 point increase in CIWA-Ar  LORazepam     Tablet 2 milliGRAM(s) Oral every 1 hour PRN Symptom-triggered: each CIWA -Ar score 8 or GREATER  nicotine - Inhaler 1 Each Inhalation two times a day PRN Breakthrough cravings  ondansetron Injectable 4 milliGRAM(s) IV Push every 6 hours PRN Nausea and/or Vomiting    Allergies    No Known Allergies    Intolerances        CONSTITUTIONAL: No acute distress. Awake and alert.  HEAD: No evidence of trauma. Structures WNL.  EYES: +PERRL. +EOMI. No scleral icterus. No conjunctival injection.  ENT: Moist oral mucosa. No erythema. No pharyngeal exudates.   NECK: Supple. Appropriate ROM. No stiffness. No masses or lymphadenopathy.  RESPIRATORY: CTAB. No wheezes, rales, or rhonchi. No accessory muscle use. No apparent respiratory distress.  CARDIOVASCULAR: +S1/S2. No audible S3/S4. Regular rate and rhythm. No murmurs, rubs, or gallops. 2+ radial pulses x b/l UE; 2+ DP pulses x b/l LE.   GASTROINTESTINAL: Soft, nontender, nondistended. +BS. No rebound or guarding.   BACK: No spinal or paraspinal tenderness. No CVA tenderness.  EXTREMITY: No LE swelling or edema. EXTs warm to touch.  MUSCULOSKELETAL: Movement evident in all limbs. No tenderness on palpation.  DERMATOLOGICAL: No abnormal rashes or lesions.  NEUROLOGICAL: CN 2-12 grossly intact. No focal deficits. Sensation intact x 4EXT. A&Ox3 (oriented to person, place, and time).  PSYCHIATRIC: Appropriate affect.                            16.5   14.85 )-----------( 249      ( 29 Nov 2019 04:40 )             48.4       11-29    137  |  96<L>  |  7   ----------------------------<  107<H>  3.6   |  26  |  0.86    Ca    9.3      29 Nov 2019 04:40  Phos  3.3     11-29  Mg     1.9     11-29    TPro  8.1  /  Alb  4.2  /  TBili  0.4  /  DBili  x   /  AST  30  /  ALT  20  /  AlkPhos  101  11-29    CAPILLARY BLOOD GLUCOSE        LIVER FUNCTIONS - ( 29 Nov 2019 04:40 )  Alb: 4.2 g/dL / Pro: 8.1 g/dL / ALK PHOS: 101 u/L / ALT: 20 u/L / AST: 30 u/L / GGT: x                       RADIOLOGY AND ADDITIONAL TESTS:    CONSULTANT NOTES REVIEWED:    CARE DISCUSSED WITH THE FOLLOWING CONSULTANTS/PROVIDERS: Contact Information:  Liliya Woodward II, MD, MPH  PGY-1, Internal Medicine  Pager: [phone] ([facility]) /// [phone] ([facility])    IZABELLA COWAN, [id number]    Patient is a 24y old  Male who presents with a chief complaint of Alcohol withdrawal (29 Nov 2019 07:52)      OVERNIGHT EVENTS: Overnight, patient removed nicotine patch because he stated that it was ineffective in controlling his cravings. He was given the nicotine inhaler instead.     SUBJECTIVE: Patient evaluated at bedside, states that he is feeling extremely tired; otherwise, denies nausea/vomiting, fever, tactile/visual/auditory hallucinations, anxiety/agitation, tremors, restlessness, excessive sweating.     CONSTITUTIONAL: +Fatigue described as excessive sleepiness.    OBJECTIVE:  Vital Signs Last 24 Hrs  T(C): 36.9 (30 Nov 2019 01:31), Max: 36.9 (30 Nov 2019 01:31)  T(F): 98.5 (30 Nov 2019 01:31), Max: 98.5 (30 Nov 2019 01:31)  HR: 108 (30 Nov 2019 01:36) (92 - 116)  BP: 152/100 (30 Nov 2019 01:31) (151/86 - 160/100)  BP(mean): --  RR: 16 (30 Nov 2019 01:31) (16 - 18)  SpO2: 99% (30 Nov 2019 01:31) (98% - 100%)  I&O's Summary      MEDICATIONS  (STANDING):  folic acid 1 milliGRAM(s) Oral daily  influenza   Vaccine 0.5 milliLiter(s) IntraMuscular once  LORazepam     Tablet   Oral   LORazepam     Tablet 2 milliGRAM(s) Oral every 4 hours  LORazepam     Tablet 1.5 milliGRAM(s) Oral every 4 hours  multivitamin 1 Tablet(s) Oral daily  nicotine - 21 mG/24Hr(s) Patch 1 patch Transdermal daily  thiamine 100 milliGRAM(s) Oral daily    MEDICATIONS  (PRN):  LORazepam     Tablet 2 milliGRAM(s) Oral every 2 hours PRN Symptom-triggered 2 point increase in CIWA-Ar  LORazepam     Tablet 2 milliGRAM(s) Oral every 1 hour PRN Symptom-triggered: each CIWA -Ar score 8 or GREATER  nicotine - Inhaler 1 Each Inhalation two times a day PRN Breakthrough cravings  ondansetron Injectable 4 milliGRAM(s) IV Push every 6 hours PRN Nausea and/or Vomiting    Allergies    No Known Allergies    Intolerances        CONSTITUTIONAL: Young male with body tattoos found laying in bed; tired but arousable, can engage in discussion  EYES: +PERRL. +EOMI. No scleral icterus. No conjunctival injection.  ENT: Moist oral mucosa. No erythema. No pharyngeal exudates.   RESPIRATORY: CTAB. No wheezes, rales, or rhonchi. No accessory muscle use. No apparent respiratory distress.  CHEST: Erythema on left upper chest with no apparent evidence of bleeding.  CARDIOVASCULAR: +S1/S2. No audible S3/S4. Regular rate and rhythm. No murmurs, rubs, or gallops. 2+ radial pulses x b/l UE; 2+ DP pulses x b/l LE.   GASTROINTESTINAL: Soft, nontender, nondistended. +BS. No rebound or guarding.   BACK: Diffuse acne.  EXTREMITY: No LE swelling or edema. EXTs warm to touch.  MUSCULOSKELETAL: Movement evident in all limbs. No tenderness on palpation.  DERMATOLOGICAL: Tattoos on face, posterior neck, chest.   NEUROLOGICAL: CN 2-12 grossly intact. No focal deficits. Sensation intact x 4EXT. A&Ox3 (oriented to person, place, and time).  PSYCHIATRIC: Appropriate affect.                        16.5   14.85 )-----------( 249      ( 29 Nov 2019 04:40 )             48.4       11-29    137  |  96<L>  |  7   ----------------------------<  107<H>  3.6   |  26  |  0.86    Ca    9.3      29 Nov 2019 04:40  Phos  3.3     11-29  Mg     1.9     11-29    TPro  8.1  /  Alb  4.2  /  TBili  0.4  /  DBili  x   /  AST  30  /  ALT  20  /  AlkPhos  101  11-29    CAPILLARY BLOOD GLUCOSE        LIVER FUNCTIONS - ( 29 Nov 2019 04:40 )  Alb: 4.2 g/dL / Pro: 8.1 g/dL / ALK PHOS: 101 u/L / ALT: 20 u/L / AST: 30 u/L / GGT: x                       RADIOLOGY AND ADDITIONAL TESTS:    CONSULTANT NOTES REVIEWED:    CARE DISCUSSED WITH THE FOLLOWING CONSULTANTS/PROVIDERS:

## 2019-11-30 NOTE — PROGRESS NOTE ADULT - PROBLEM SELECTOR PLAN 3
likely reactive. No fevers  F/U CXR  monitor off Abx for now. Send Bcx, UA, Ucx if febrile - Likely reactive; unlikely infectious as no fevers  - WBC 13.57 (down from 14.85)  - CXR - clear lungs  - Monitor off Abx for now   - Will send Bcx, UA, Ucx if febrile

## 2019-11-30 NOTE — PROGRESS NOTE ADULT - PROBLEM SELECTOR PLAN 5
- DVT prophylaxis: None as patient is ambulatory  - Diet: Regular, no pork  - Disposition: Pending, most likely to home  - Health maintainence: Patient amenable to Hepatitis and HIV testing - DVT prophylaxis: None as patient is ambulatory  - Diet: Regular, no pork  - Disposition: Pending, most likely to home  - Health maintainence: Patient amenable to Hepatitis and HIV testing  - SW consult pending

## 2019-11-30 NOTE — PROVIDER CONTACT NOTE (OTHER) - ASSESSMENT
patient AxOx4, no acute distress noted, patient just woke up/anxious at this time sitting up. Denies chest pain, fevers/chills, SOB, n/v/dizziness, or pain at this time. CIWA protocol maintained as ordered. patient received ativan taper as per order. safety maintained and will continue to monitor

## 2019-11-30 NOTE — PROGRESS NOTE ADULT - PROBLEM SELECTOR PLAN 4
- States that he has had a cough for approximately 1 year which he states has gotten worse, suspect chronic bronchitis related to smoking. Will need cancer screening with CT chest as outpt. Smoking cessation advised  - Productive of white sputum, no recent hemoptysis  - No respiratory distress or accessory muscle use per physical examination  - CXR to evaluate for intrapulmonary pathology - States that he has had a cough for approximately 1 year which he states has gotten worse, suspect chronic bronchitis related to smoking  - Productive of white sputum, no recent hemoptysis  - No respiratory distress or accessory muscle use per physical examination  - CXR negative  - Will need cancer screening with CT chest as outpatient. Smoking cessation advised

## 2019-11-30 NOTE — PROGRESS NOTE ADULT - ASSESSMENT
24M with admitted polysubstance abuse hx (cocaine, alcohol, Xanax/Valium) presents with b/u UE tremors concerning for alcohol withdrawal, currently on CIWA protocol. 24M with admitted polysubstance abuse hx (cocaine, alcohol, Xanax/Valium) presents with b/u UE tremors concerning for alcohol withdrawal, currently on CIWA protocol, on day 2.

## 2019-12-01 ENCOUNTER — TRANSCRIPTION ENCOUNTER (OUTPATIENT)
Age: 24
End: 2019-12-01

## 2019-12-01 VITALS
TEMPERATURE: 98 F | OXYGEN SATURATION: 100 % | HEART RATE: 108 BPM | SYSTOLIC BLOOD PRESSURE: 157 MMHG | DIASTOLIC BLOOD PRESSURE: 99 MMHG | RESPIRATION RATE: 19 BRPM

## 2019-12-01 LAB
ANION GAP SERPL CALC-SCNC: 12 MMO/L — SIGNIFICANT CHANGE UP (ref 7–14)
BUN SERPL-MCNC: 6 MG/DL — LOW (ref 7–23)
CALCIUM SERPL-MCNC: 9.2 MG/DL — SIGNIFICANT CHANGE UP (ref 8.4–10.5)
CHLORIDE SERPL-SCNC: 101 MMOL/L — SIGNIFICANT CHANGE UP (ref 98–107)
CO2 SERPL-SCNC: 25 MMOL/L — SIGNIFICANT CHANGE UP (ref 22–31)
CREAT SERPL-MCNC: 0.93 MG/DL — SIGNIFICANT CHANGE UP (ref 0.5–1.3)
GLUCOSE SERPL-MCNC: 97 MG/DL — SIGNIFICANT CHANGE UP (ref 70–99)
HAV IGM SER-ACNC: NONREACTIVE — SIGNIFICANT CHANGE UP
HBV CORE IGM SER-ACNC: NONREACTIVE — SIGNIFICANT CHANGE UP
HBV SURFACE AG SER-ACNC: NONREACTIVE — SIGNIFICANT CHANGE UP
HCT VFR BLD CALC: 51.2 % — HIGH (ref 39–50)
HCV AB S/CO SERPL IA: 0.2 S/CO — SIGNIFICANT CHANGE UP (ref 0–0.99)
HCV AB SERPL-IMP: SIGNIFICANT CHANGE UP
HGB BLD-MCNC: 16.3 G/DL — SIGNIFICANT CHANGE UP (ref 13–17)
MAGNESIUM SERPL-MCNC: 2.1 MG/DL — SIGNIFICANT CHANGE UP (ref 1.6–2.6)
MCHC RBC-ENTMCNC: 28.6 PG — SIGNIFICANT CHANGE UP (ref 27–34)
MCHC RBC-ENTMCNC: 31.8 % — LOW (ref 32–36)
MCV RBC AUTO: 90 FL — SIGNIFICANT CHANGE UP (ref 80–100)
NRBC # FLD: 0 K/UL — SIGNIFICANT CHANGE UP (ref 0–0)
PHOSPHATE SERPL-MCNC: 3.7 MG/DL — SIGNIFICANT CHANGE UP (ref 2.5–4.5)
PLATELET # BLD AUTO: 224 K/UL — SIGNIFICANT CHANGE UP (ref 150–400)
PMV BLD: 10.3 FL — SIGNIFICANT CHANGE UP (ref 7–13)
POTASSIUM SERPL-MCNC: 3.5 MMOL/L — SIGNIFICANT CHANGE UP (ref 3.5–5.3)
POTASSIUM SERPL-SCNC: 3.5 MMOL/L — SIGNIFICANT CHANGE UP (ref 3.5–5.3)
RBC # BLD: 5.69 M/UL — SIGNIFICANT CHANGE UP (ref 4.2–5.8)
RBC # FLD: 14.6 % — HIGH (ref 10.3–14.5)
SODIUM SERPL-SCNC: 138 MMOL/L — SIGNIFICANT CHANGE UP (ref 135–145)
WBC # BLD: 12.51 K/UL — HIGH (ref 3.8–10.5)
WBC # FLD AUTO: 12.51 K/UL — HIGH (ref 3.8–10.5)

## 2019-12-01 PROCEDURE — 99239 HOSP IP/OBS DSCHRG MGMT >30: CPT | Mod: GC

## 2019-12-01 RX ORDER — ONDANSETRON 8 MG/1
4 TABLET, FILM COATED ORAL ONCE
Refills: 0 | Status: COMPLETED | OUTPATIENT
Start: 2019-12-01 | End: 2019-12-01

## 2019-12-01 RX ORDER — THIAMINE MONONITRATE (VIT B1) 100 MG
1 TABLET ORAL
Qty: 30 | Refills: 0
Start: 2019-12-01 | End: 2019-12-30

## 2019-12-01 RX ORDER — FOLIC ACID 0.8 MG
1 TABLET ORAL
Qty: 30 | Refills: 0
Start: 2019-12-01 | End: 2019-12-30

## 2019-12-01 RX ORDER — NICOTINE POLACRILEX 2 MG
1 GUM BUCCAL
Qty: 30 | Refills: 0
Start: 2019-12-01 | End: 2019-12-30

## 2019-12-01 RX ORDER — NICOTINE POLACRILEX 2 MG
4 GUM BUCCAL ONCE
Refills: 0 | Status: DISCONTINUED | OUTPATIENT
Start: 2019-12-01 | End: 2019-12-01

## 2019-12-01 RX ADMIN — Medication 1 EACH: at 08:19

## 2019-12-01 RX ADMIN — Medication 1 TABLET(S): at 12:11

## 2019-12-01 RX ADMIN — Medication 1 MILLIGRAM(S): at 12:11

## 2019-12-01 RX ADMIN — Medication 1 PATCH: at 11:19

## 2019-12-01 RX ADMIN — ONDANSETRON 4 MILLIGRAM(S): 8 TABLET, FILM COATED ORAL at 12:28

## 2019-12-01 RX ADMIN — Medication 1 PATCH: at 07:05

## 2019-12-01 RX ADMIN — Medication 100 MILLIGRAM(S): at 12:11

## 2019-12-01 NOTE — PROGRESS NOTE ADULT - ATTENDING COMMENTS
Patient was seen and examined personally by me. I have discussed the plan and reviewed the resident's note and agree with the above physical exam findings including assessment and plan except as indicated below.    Ativan taper DCed on 11/30 and placed on symptom triggered. Received one dose Ativan PRN last on 11/30 at 6pm. Tremors improved  CIWA: 0-1  Educated on imprortance of stopping cocaine use, ETOH and smoking. Discussed risks of blood transmitted infections with continued cocaine snorting, stroke, CAD, PAD, cancer, liver disease with continued smoking and ETOH use.   SW to give outpt resources and contact information. Patient can followup at [facility] crisis clinic or DAEHRS program at [facility]  STD screening: HIV negative  Stable for DC. DC time: 34 min
Patient was seen and examined personally by me. I have discussed the plan and reviewed the resident's note and agree with the above physical exam findings including assessment and plan except as indicated below.    Ativan taper PO for now. CIWA: 3, mild hand tremors  STD screening: HIV sent, awaiting urine G/C, hepatitis panel   liang for outpatient resources

## 2019-12-01 NOTE — DISCHARGE NOTE PROVIDER - NSDCCPCAREPLAN_GEN_ALL_CORE_FT
PRINCIPAL DISCHARGE DIAGNOSIS  Diagnosis: Alcohol withdrawal  Assessment and Plan of Treatment: You were admitted because of upper extremity tremors in the setting of significant alcohol use and history of mixing with Valium/Xanax concerning for withdrawal. While in the hospital, you were placed on an Ativan taper to reduce the likelihood of withdrawal symptoms. You were monitored to assess the symptoms of withdrawal. Your scores remained very low and the taper was eventually discontinued. You remained clinically stable and did not develop any new symptoms of withdrawal. You are advised to follow-up with your primary care doctor. You are also advised to participate in rehabilitation for your alcohol use to minimize or potentially eliminate consumption altogether.      SECONDARY DISCHARGE DIAGNOSES  Diagnosis: Substance abuse  Assessment and Plan of Treatment: You were admitted due to upper extremity tremors likely due to alcohol withdrawal. You also admitted to using benzodiazepines (Xanax and Valium), cocaine, and nicotine. You are advised to enroll in a rehabilitation program such that you can minimize and hopefully completely eliminate use of the above substances.    Diagnosis: Leukocytosis, unspecified type  Assessment and Plan of Treatment: You were admitted due to upper extremity tremors liley due to alcohol withdrawal. You were noted to have an elevated white blood cell count. While usually indicative of infection, your elevated white cell count more reflects a reaction to your alcohol use and withdrawal. It was trended and noted to be decreasing towards normal. You are advised to follow-up with your primary care physician.    Diagnosis: Cough  Assessment and Plan of Treatment: You were admitted for management of alcohol withdrawal. You were noted to have a chronic cough, in the setting of cigarette/nicotine use. A chest xray demonstrated clear lungs. You are advised to follow-up with your primary care provider for follow-up. PRINCIPAL DISCHARGE DIAGNOSIS  Diagnosis: Alcohol withdrawal  Assessment and Plan of Treatment: You were admitted because of upper extremity tremors in the setting of significant alcohol use and history of mixing with Valium/Xanax concerning for withdrawal. While in the hospital, you were placed on an Ativan taper to reduce the likelihood of withdrawal symptoms. You were monitored to assess the symptoms of withdrawal. Your scores remained very low and the taper was eventually discontinued. You remained clinically stable and did not develop any new symptoms of withdrawal. You are advised to follow-up with your primary care doctor. You are also advised to participate in rehabilitation for your alcohol use to minimize or potentially eliminate consumption altogether.      SECONDARY DISCHARGE DIAGNOSES  Diagnosis: Screen for STD (sexually transmitted disease)  Assessment and Plan of Treatment: We tested you for HIV which was negative.    Diagnosis: Substance abuse  Assessment and Plan of Treatment: You were admitted due to upper extremity tremors likely due to alcohol withdrawal. You also admitted to using benzodiazepines (Xanax and Valium), cocaine, and nicotine. You are advised to enroll in a rehabilitation program such that you can minimize and hopefully completely eliminate use of the above substances.    Diagnosis: Leukocytosis, unspecified type  Assessment and Plan of Treatment: You were admitted due to upper extremity tremors liley due to alcohol withdrawal. You were noted to have an elevated white blood cell count. While usually indicative of infection, your elevated white cell count more reflects a reaction to your alcohol use and withdrawal. It was trended and noted to be decreasing towards normal. You are advised to follow-up with your primary care physician.    Diagnosis: Cough  Assessment and Plan of Treatment: You were admitted for management of alcohol withdrawal. You were noted to have a chronic cough, in the setting of cigarette/nicotine use. A chest xray demonstrated clear lungs. You are advised to follow-up with your primary care provider for follow-up.

## 2019-12-01 NOTE — PROGRESS NOTE ADULT - PROBLEM SELECTOR PLAN 6
Transitions of Care Status:  1.  Name of PCP: Unknown -- will ask prior to discharge.  2.  PCP Contacted on Admission: [ ] Y    [x] N    3.  PCP contacted at Discharge: [ ] Y    [ ] N    [x] N/A   4.  Post-Discharge Appointment Date and Location: N/A; pt will make appointment after discharge as office closed  5.  Summary of Handoff given to PCP: N/A as office closed on day of discharge.

## 2019-12-01 NOTE — PROGRESS NOTE ADULT - PROBLEM SELECTOR PLAN 1
Now scoring 1 on CIWA, no longer having tremors or needed prn ativan.    - will send home with thiamine, folic acid and mvi

## 2019-12-01 NOTE — SBIRT NOTE ADULT - NSSBIRTALCPASSREFTXDET_GEN_A_CORE
SW support and education provided. Patient referred to DAEHRS program at [facility].

## 2019-12-01 NOTE — DISCHARGE NOTE PROVIDER - NSDCHC_MEDRECSTATUS_GEN_ALL_CORE
Admission Reconciliation is Completed  Discharge Reconciliation is Not Complete Admission Reconciliation is Completed  Discharge Reconciliation is Completed negative

## 2019-12-01 NOTE — DISCHARGE NOTE NURSING/CASE MANAGEMENT/SOCIAL WORK - NSDCPNDISPN_GEN_ALL_CORE
Side effects of pain management treatment/Safe use, storage and disposal of opioids when prescribed/Activities of daily living, including home environment that might     exacerbate pain or reduce effectiveness of the pain management plan of care as well as strategies to address these issues/Education provided on the pain management plan of care/Opioids not applicable/not prescribed

## 2019-12-01 NOTE — DISCHARGE NOTE PROVIDER - NSDCFUADDAPPT_GEN_ALL_CORE_FT
You do not have a primary care doctor.  You should call [phone] for an appointment to establish and primary care physician.

## 2019-12-01 NOTE — DISCHARGE NOTE PROVIDER - HOSPITAL COURSE
History of Present Illness    24M PMHx polysubstance abuse presents with upper extremity tremors. 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 He states that his cocaine use was last week and he has not mixed Xanax/Valium with alcohol for about a month. His last alcohol intake was 9pm on day before presentation. He states that he had significant tremors in his upper extremities but initially refused to come to the hospital; he was brought in by his mother and girlfriend. The patient endorses nausea, vomiting, fatigue, and a chronic cough that he states has gotten worse recently. He denies tactile/visual/auditory hallucinations, palpitations, SOB, fever, lightheadedness, dizziness, ABD pain. Of note, the patient states that he participated in outpatient rehab earlier this year but went back to drinking afterwards. He expresses a desire to break his dependence and is amenable to outpatient rehab.         Emergency Department Course    In the ED: /112, , RR 18, O2 Sat 100, T98.4. WBC 14.85, CBC otherwise WNL. CMP WNL. Urine toxicology negative. EKG - sinus tachycardia @ 113, QTc 441. CIWA scores 5 and 5. Given NS + multivitamin/thiamine/folic acid additives, lorazepam 2mg x 1.        Hospital Course History of Present Illness    24M PMHx polysubstance abuse presents with upper extremity tremors. 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 He states that his cocaine use was last week and he has not mixed Xanax/Valium with alcohol for about a month. His last alcohol intake was 9pm on day before presentation. He states that he had significant tremors in his upper extremities but initially refused to come to the hospital; he was brought in by his mother and girlfriend. The patient endorses nausea, vomiting, fatigue, and a chronic cough that he states has gotten worse recently. He denies tactile/visual/auditory hallucinations, palpitations, SOB, fever, lightheadedness, dizziness, ABD pain. Of note, the patient states that he participated in outpatient rehab earlier this year but went back to drinking afterwards. He expresses a desire to break his dependence and is amenable to outpatient rehab.         Emergency Department Course    In the ED: /112, , RR 18, O2 Sat 100, T98.4. WBC 14.85, CBC otherwise WNL. CMP WNL. Urine toxicology negative. EKG - sinus tachycardia @ 113, QTc 441. CIWA scores 5 and 5. Given NS + multivitamin/thiamine/folic acid additives, lorazepam 2mg x 1.        Hospital Course    Patient was admitted to the hospital floor. He was promptly initiated on Ativan taper given his risk of benzo/alcohol withdrawal. CIWA scores were stable at 3 and then dropped to zero. The taper was discontinued and he remained on symptom-triggered ativan as needed. The patient's blood pressure and heart rate were slightly elevated but remained stable and noted resolution of his UE tremors; patient did not demonstrate any new overt signs of withdrawal. The patient was given nicotine patch, gum, and eventually inhaler for his nicotine dependence. A social work consult was placed to assist patient in minimizing and eventually eliminating his drug use. When asked, the patient demonstrated amenability to participating in outpatient rehab.        On the date of discharge, the patient was evaluated to be clinically stable; he was assessed to be an appropriate candidate for discharge.

## 2019-12-01 NOTE — PROGRESS NOTE ADULT - SUBJECTIVE AND OBJECTIVE BOX
Prudence Yanes MD, PhD  PGY-3| Internal Medicine  [phone] / [phone]  TEAM 2 COVERAGE  ==========================    Patient is a 24y old  Male who presents with a chief complaint of Alcohol withdrawal (01 Dec 2019 06:36)    Interval History: Pt examined at bedside this am.  No acute events overnight.  This am, pt feeling anxious and asking to go home.  Pt states that he is worried he will use when he gets home and is asking about options to avoid using.  Endorses mother and girlfriend as supportive factors, however states that he does feel he is at risk of relapse.    MEDICATIONS  (STANDING):  folic acid 1 milliGRAM(s) Oral daily  influenza   Vaccine 0.5 milliLiter(s) IntraMuscular once  multivitamin 1 Tablet(s) Oral daily  nicotine - 21 mG/24Hr(s) Patch 1 patch Transdermal daily  thiamine 100 milliGRAM(s) Oral daily    MEDICATIONS  (PRN):  LORazepam     Tablet 2 milliGRAM(s) Oral every 2 hours PRN Symptom-triggered 2 point increase in CIWA-Ar  LORazepam     Tablet 2 milliGRAM(s) Oral every 1 hour PRN Symptom-triggered: each CIWA -Ar score 8 or GREATER  nicotine - Inhaler 1 Each Inhalation two times a day PRN Breakthrough cravings  ondansetron Injectable 4 milliGRAM(s) IV Push every 6 hours PRN Nausea and/or Vomiting      Objective    Vital Signs Last 24 Hrs  T(C): 36.7 (01 Dec 2019 09:55), Max: 37.1 (30 Nov 2019 21:31)  T(F): 98 (01 Dec 2019 09:55), Max: 98.7 (30 Nov 2019 21:31)  HR: 108 (01 Dec 2019 09:55) (95 - 125)  BP: 157/99 (01 Dec 2019 09:55) (142/93 - 163/73)  BP(mean): --  RR: 19 (01 Dec 2019 09:55) (16 - 20)  SpO2: 100% (01 Dec 2019 09:55) (97% - 100%)      CAPILLARY BLOOD GLUCOSE            11-30-19 @ 07:01  -  12-01-19 @ 07:00  --------------------------------------------------------  IN: 0 mL / OUT: 0 mL / NET: 0 mL        Appearance: Sitting in bed, NAD  HEENT:   Normal oral mucosa, PERRL, EOMI, anicteric sclera  Cardiovascular: RRR, No murmurs, gallops or rubs appreciated  Respiratory: Lungs clear to auscultation bilaterally, no wheezes, crackles appreciated  Gastrointestinal:  Soft, nondistended, nontender, +BS	  Skin: No rashes, eccymosis or cyanosis noted	  Neurologic: AOx3, CNII-XII grossly intact, motor and sensory function grossly intact  Extremities: Moving all extremities equally; no tremors noted.  Vascular: palpable dp, pt, and radial pulses 2+ bilaterally  Psych:  anxious mood and affect, responds to questions appropriately      12-01    138  |  101  |  6<L>  ----------------------------<  97  3.5   |  25  |  0.93  11-30    136  |  101  |  9   ----------------------------<  104<H>  3.9   |  21<L>  |  0.76  11-29    137  |  96<L>  |  7   ----------------------------<  107<H>  3.6   |  26  |  0.86    Ca    9.2      01 Dec 2019 06:15  Ca    9.1      30 Nov 2019 06:30  Ca    9.3      29 Nov 2019 04:40  Phos  3.7     12-01  Mg     2.1     12-01    TPro  8.1  /  Alb  4.2  /  TBili  0.4  /  DBili  x   /  AST  30  /  ALT  20  /  AlkPhos  101  11-29                                              16.3   12.51 )-----------( 224      ( 01 Dec 2019 06:15 )             51.2                         16.4   13.57 )-----------( 229      ( 30 Nov 2019 06:30 )             51.5                         16.5   14.85 )-----------( 249      ( 29 Nov 2019 04:40 )             48.4     CAPILLARY BLOOD GLUCOSE              Radiology & Imaging    Imaging Personally Reviewed:    Consultant Notes Reviewed

## 2019-12-01 NOTE — PROGRESS NOTE ADULT - ASSESSMENT
24M with admitted polysubstance abuse hx (cocaine, alcohol, Xanax/Valium) presents with b/u UE tremors concerning for alcohol withdrawal, currently on CIWA protocol, on day 3, scoring 1 for agitation.

## 2019-12-01 NOTE — DISCHARGE NOTE NURSING/CASE MANAGEMENT/SOCIAL WORK - PATIENT PORTAL LINK FT
You can access the FollowMyHealth Patient Portal offered by [facility] by registering at the following website: [email or web address]. By joining [email or web address]’s FollowMyHealth portal, you will also be able to view your health information using other applications (apps) compatible with our system.

## 2019-12-01 NOTE — SBIRT NOTE ADULT - NSSBIRTDRGPASSREFTXDET_GEN_A_CORE
SW support and education provided. Patient referred to DAEHRS program at [facility].

## 2019-12-01 NOTE — DISCHARGE NOTE PROVIDER - NSFOLLOWUPCLINICS_GEN_ALL_ED_FT
[facility] [place]  Internal Medicine  [address] [place] [address]  Phone: [phone]  Fax: [phone]  Follow Up Time: 2 weeks    [facility] Psych Dept of Substance Abuse  Psychiatry Substance Abuse  [address] [place] [address]  Phone: [phone]  Fax:   Follow Up Time: 2 weeks

## 2019-12-01 NOTE — PROVIDER CONTACT NOTE (OTHER) - ASSESSMENT
Patient became increasingly agitated stating he wanted to smoke a cigarette and leave the hospital. Patient partially ripped out PIV. Security was called to bedside and doctor was called to bedside.

## 2019-12-01 NOTE — PROGRESS NOTE ADULT - PROBLEM SELECTOR PLAN 4
likely 2/2 to smoking, smoking cessation advised.  Pt is reluctant to give up smoking cigarrettes at this time stating with cessation of other substances, this would be too much stress.  - cough is improving today.

## 2019-12-01 NOTE — DISCHARGE NOTE PROVIDER - NSDCMRMEDTOKEN_GEN_ALL_CORE_FT
folic acid 1 mg oral tablet: 1 tab(s) orally once a day  Multiple Vitamins oral tablet: 1 tab(s) orally once a day  Nicoderm C-Q 21 mg/24 hr transdermal film, extended release: 1 patch transdermal once a day. Please use on alternting arms  thiamine 100 mg oral tablet: 1 tab(s) orally once a day folic acid 1 mg oral tablet: 1 tab(s) orally once a day  Multiple Vitamins oral tablet: 1 tab(s) orally once a day  thiamine 100 mg oral tablet: 1 tab(s) orally once a day

## 2019-12-01 NOTE — DISCHARGE NOTE NURSING/CASE MANAGEMENT/SOCIAL WORK - NSDCFUADDAPPT_GEN_ALL_CORE_FT
You do not have a primary care doctor.  You should call [phone] for an appointment to establish and primary care physician.

## 2019-12-01 NOTE — PROVIDER CONTACT NOTE (OTHER) - RECOMMENDATIONS
Doctor saw patient at bedside and discussed risks and benefits of leaving hospital before properly discharged.

## 2019-12-01 NOTE — PROVIDER CONTACT NOTE (OTHER) - ACTION/TREATMENT ORDERED:
Doctor saw patient at bedside and discussed risks and benefits of leaving hospital before properly discharged. Doctor assessed patient and cleared patient to be discharged.

## 2019-12-01 NOTE — DISCHARGE NOTE PROVIDER - CARE PROVIDER_API CALL
[facility] [place],   see clinic follow up  Phone: (   )    -  Fax: (   )    -  Follow Up Time:

## 2019-12-01 NOTE — DISCHARGE NOTE PROVIDER - PROVIDER TOKENS
FREE:[LAST:[[facility] [place]],PHONE:[(   )    -],FAX:[(   )    -],ADDRESS:[see clinic follow up]]

## 2019-12-01 NOTE — PROGRESS NOTE ADULT - PROBLEM SELECTOR PLAN 5
- DVT prophylaxis: None as patient is ambulatory  - Diet: Regular, no pork  - Disposition: home today.  - Health maintainence: HIV neg; HCV pending.

## 2019-12-01 NOTE — PROGRESS NOTE ADULT - PROBLEM SELECTOR PLAN 2
- Patient admits addiction to cocaine (last use 1 week ago), alcohol (1 bottle of whiskey daily x 4 years, increased to 2-3 bottles daily within last 2 weeks); mixes alcohol with Xanax and Valium (last mix 1 month ago)  - Pt has referral to [facility] substance.  - spoke to SW who will speak to patient regarding options for rehab as well.

## 2019-12-01 NOTE — PROVIDER CONTACT NOTE (OTHER) - BACKGROUND
Patient admitted for ETOH withdrawal and substance abuse. Patient completed Ativan taper during hospital stay.

## 2019-12-02 LAB
C TRACH RRNA SPEC QL NAA+PROBE: SIGNIFICANT CHANGE UP
N GONORRHOEA RRNA SPEC QL NAA+PROBE: SIGNIFICANT CHANGE UP

## 2019-12-07 ENCOUNTER — EMERGENCY (EMERGENCY)
Facility: HOSPITAL | Age: 24
LOS: 1 days | Discharge: HOME | End: 2019-12-07
Attending: EMERGENCY MEDICINE
Payer: COMMERCIAL

## 2019-12-07 VITALS
RESPIRATION RATE: 16 BRPM | HEART RATE: 105 BPM | DIASTOLIC BLOOD PRESSURE: 98 MMHG | OXYGEN SATURATION: 98 % | SYSTOLIC BLOOD PRESSURE: 143 MMHG | HEIGHT: 66 IN | WEIGHT: 160.06 LBS | TEMPERATURE: 98 F

## 2019-12-07 VITALS
OXYGEN SATURATION: 98 % | HEART RATE: 93 BPM | TEMPERATURE: 98 F | RESPIRATION RATE: 18 BRPM | SYSTOLIC BLOOD PRESSURE: 126 MMHG | DIASTOLIC BLOOD PRESSURE: 76 MMHG

## 2019-12-07 PROBLEM — F19.10 OTHER PSYCHOACTIVE SUBSTANCE ABUSE, UNCOMPLICATED: Chronic | Status: ACTIVE | Noted: 2019-11-29

## 2019-12-07 LAB
ALBUMIN SERPL ELPH-MCNC: 4.7 G/DL — SIGNIFICANT CHANGE UP (ref 3.3–5)
ALP SERPL-CCNC: 92 U/L — SIGNIFICANT CHANGE UP (ref 40–120)
ALT FLD-CCNC: 38 U/L — SIGNIFICANT CHANGE UP (ref 10–45)
ANION GAP SERPL CALC-SCNC: 17 MMOL/L — SIGNIFICANT CHANGE UP (ref 5–17)
APAP SERPL-MCNC: <15 UG/ML — SIGNIFICANT CHANGE UP (ref 10–30)
AST SERPL-CCNC: 26 U/L — SIGNIFICANT CHANGE UP (ref 10–40)
BASOPHILS # BLD AUTO: 0.06 K/UL — SIGNIFICANT CHANGE UP (ref 0–0.2)
BASOPHILS NFR BLD AUTO: 0.5 % — SIGNIFICANT CHANGE UP (ref 0–2)
BILIRUB SERPL-MCNC: 0.3 MG/DL — SIGNIFICANT CHANGE UP (ref 0.2–1.2)
BUN SERPL-MCNC: 4 MG/DL — LOW (ref 7–23)
CALCIUM SERPL-MCNC: 9.4 MG/DL — SIGNIFICANT CHANGE UP (ref 8.4–10.5)
CHLORIDE SERPL-SCNC: 98 MMOL/L — SIGNIFICANT CHANGE UP (ref 96–108)
CO2 SERPL-SCNC: 26 MMOL/L — SIGNIFICANT CHANGE UP (ref 22–31)
CREAT SERPL-MCNC: 0.83 MG/DL — SIGNIFICANT CHANGE UP (ref 0.5–1.3)
EOSINOPHIL # BLD AUTO: 0.2 K/UL — SIGNIFICANT CHANGE UP (ref 0–0.5)
EOSINOPHIL NFR BLD AUTO: 1.6 % — SIGNIFICANT CHANGE UP (ref 0–6)
ETHANOL SERPL-MCNC: 156 MG/DL — HIGH (ref 0–10)
GLUCOSE SERPL-MCNC: 115 MG/DL — HIGH (ref 70–99)
HCT VFR BLD CALC: 50.2 % — HIGH (ref 39–50)
HGB BLD-MCNC: 16.9 G/DL — SIGNIFICANT CHANGE UP (ref 13–17)
IMM GRANULOCYTES NFR BLD AUTO: 0.6 % — SIGNIFICANT CHANGE UP (ref 0–1.5)
LYMPHOCYTES # BLD AUTO: 17.4 % — SIGNIFICANT CHANGE UP (ref 13–44)
LYMPHOCYTES # BLD AUTO: 2.21 K/UL — SIGNIFICANT CHANGE UP (ref 1–3.3)
MAGNESIUM SERPL-MCNC: 2.5 MG/DL — SIGNIFICANT CHANGE UP (ref 1.6–2.6)
MCHC RBC-ENTMCNC: 29.2 PG — SIGNIFICANT CHANGE UP (ref 27–34)
MCHC RBC-ENTMCNC: 33.7 GM/DL — SIGNIFICANT CHANGE UP (ref 32–36)
MCV RBC AUTO: 86.7 FL — SIGNIFICANT CHANGE UP (ref 80–100)
MONOCYTES # BLD AUTO: 1.01 K/UL — HIGH (ref 0–0.9)
MONOCYTES NFR BLD AUTO: 8 % — SIGNIFICANT CHANGE UP (ref 2–14)
NEUTROPHILS # BLD AUTO: 9.13 K/UL — HIGH (ref 1.8–7.4)
NEUTROPHILS NFR BLD AUTO: 71.9 % — SIGNIFICANT CHANGE UP (ref 43–77)
NRBC # BLD: 0 /100 WBCS — SIGNIFICANT CHANGE UP (ref 0–0)
PLATELET # BLD AUTO: 269 K/UL — SIGNIFICANT CHANGE UP (ref 150–400)
POTASSIUM SERPL-MCNC: 3.6 MMOL/L — SIGNIFICANT CHANGE UP (ref 3.5–5.3)
POTASSIUM SERPL-SCNC: 3.6 MMOL/L — SIGNIFICANT CHANGE UP (ref 3.5–5.3)
PROT SERPL-MCNC: 7.9 G/DL — SIGNIFICANT CHANGE UP (ref 6–8.3)
RBC # BLD: 5.79 M/UL — SIGNIFICANT CHANGE UP (ref 4.2–5.8)
RBC # FLD: 14.6 % — HIGH (ref 10.3–14.5)
SALICYLATES SERPL-MCNC: <2 MG/DL — LOW (ref 15–30)
SODIUM SERPL-SCNC: 141 MMOL/L — SIGNIFICANT CHANGE UP (ref 135–145)
WBC # BLD: 12.69 K/UL — HIGH (ref 3.8–10.5)
WBC # FLD AUTO: 12.69 K/UL — HIGH (ref 3.8–10.5)

## 2019-12-07 PROCEDURE — 83735 ASSAY OF MAGNESIUM: CPT

## 2019-12-07 PROCEDURE — 96374 THER/PROPH/DIAG INJ IV PUSH: CPT

## 2019-12-07 PROCEDURE — 80053 COMPREHEN METABOLIC PANEL: CPT

## 2019-12-07 PROCEDURE — 96375 TX/PRO/DX INJ NEW DRUG ADDON: CPT

## 2019-12-07 PROCEDURE — 80307 DRUG TEST PRSMV CHEM ANLYZR: CPT

## 2019-12-07 PROCEDURE — 84100 ASSAY OF PHOSPHORUS: CPT

## 2019-12-07 PROCEDURE — 96361 HYDRATE IV INFUSION ADD-ON: CPT

## 2019-12-07 PROCEDURE — 99285 EMERGENCY DEPT VISIT HI MDM: CPT | Mod: 25

## 2019-12-07 PROCEDURE — 73130 X-RAY EXAM OF HAND: CPT | Mod: 26,RT

## 2019-12-07 PROCEDURE — 99284 EMERGENCY DEPT VISIT MOD MDM: CPT

## 2019-12-07 PROCEDURE — 73130 X-RAY EXAM OF HAND: CPT

## 2019-12-07 PROCEDURE — 85027 COMPLETE CBC AUTOMATED: CPT

## 2019-12-07 RX ORDER — SODIUM CHLORIDE 9 MG/ML
1000 INJECTION, SOLUTION INTRAVENOUS
Refills: 0 | Status: DISCONTINUED | OUTPATIENT
Start: 2019-12-07 | End: 2019-12-17

## 2019-12-07 RX ORDER — ONDANSETRON 8 MG/1
4 TABLET, FILM COATED ORAL ONCE
Refills: 0 | Status: COMPLETED | OUTPATIENT
Start: 2019-12-07 | End: 2019-12-07

## 2019-12-07 RX ORDER — SODIUM CHLORIDE 9 MG/ML
1000 INJECTION INTRAMUSCULAR; INTRAVENOUS; SUBCUTANEOUS ONCE
Refills: 0 | Status: COMPLETED | OUTPATIENT
Start: 2019-12-07 | End: 2019-12-07

## 2019-12-07 RX ADMIN — SODIUM CHLORIDE 1000 MILLILITER(S): 9 INJECTION INTRAMUSCULAR; INTRAVENOUS; SUBCUTANEOUS at 07:15

## 2019-12-07 RX ADMIN — SODIUM CHLORIDE 125 MILLILITER(S): 9 INJECTION, SOLUTION INTRAVENOUS at 09:31

## 2019-12-07 RX ADMIN — ONDANSETRON 4 MILLIGRAM(S): 8 TABLET, FILM COATED ORAL at 07:34

## 2019-12-07 RX ADMIN — SODIUM CHLORIDE 1000 MILLILITER(S): 9 INJECTION INTRAMUSCULAR; INTRAVENOUS; SUBCUTANEOUS at 06:08

## 2019-12-07 RX ADMIN — Medication 25 MILLIGRAM(S): at 06:08

## 2019-12-07 NOTE — CHART NOTE - NSCHARTNOTEFT_GEN_A_CORE
Social work referred to pt in the ED for psychosocial and needs assessment. Medical chart reviewed. Pt is a 23 y/o male BIBEMS to [facility] ED with chief complaint of intoxication. Per chart pt recently d/c’d from [facility] last week for intoxication.     LMSW met with pt and his parents, Elizabeth/[phone], at bedside to introduce self and explain role. Pt verbalized understanding and confirmed his demographics. Pt states that he lives alone in an apartment located in [facility]. Pt reports that prior to admission, he was ambulatory without device and able to complete ADLs independently. Pt declines to identify a caregiver. Pt with no HCP or any additional advanced directives; education provided. Pt with HIP exchange health insurance benefits. No financial, cultural or spiritual needs identified at this time.     LMSW explored with pt his substance use. Pt stating that he has an extensive hx of ETOH misuse. Pt stated that he started drinking at the age of 14 and is currently drinking 1.5L of alcohol daily with his last drink last night before EMS brought him to [facility]. Pt stated that he has experienced withdrawls in the past when he attempted to stop drinking. Pt stated that he has also had issue with substance use as well. Pt states he last used Xanax&Percocet 1 month ago and cocaine 2 wks ago. Pt reports he started using these substances at ages 21 and 17 respectively. Pt recently admitted to and d/c’d from [facility] on 11/30 for taper. Pt stated that the only treatment he has ever received was court mandated (May-Aug 2019) after his Nov 2018 DUI. Pt reports that his longest period of sobriety was for 8 months 3 yrs ago. Pt stating that he feels that he is ready for a change and but is declining detox treatment with medical team. LMSW discussed with pt inpatient treatment and his ongoing care needs. After providing a large amount of support to pt and family and questioning pts ambivalence, pt in agreement to inpt rehab. List provided. Pt requesting [facility]. Referral made. Pts parent to accompany him and provide transport directly to facility. Contact information provided and social work availability assured.
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
## 2019-12-07 NOTE — ED ADULT NURSE NOTE - NSIMPLEMENTINTERV_GEN_ALL_ED
Implemented All Fall Risk Interventions:  [place] to call system. Call bell, personal items and telephone within reach. Instruct patient to call for assistance. Room bathroom lighting operational. Non-slip footwear when patient is off stretcher. Physically safe environment: no spills, clutter or unnecessary equipment. Stretcher in lowest position, wheels locked, appropriate side rails in place. Provide visual cue, wrist band, yellow gown, etc. Monitor gait and stability. Monitor for mental status changes and reorient to person, place, and time. Review medications for side effects contributing to fall risk. Reinforce activity limits and safety measures with patient and family.

## 2019-12-07 NOTE — ED PROVIDER NOTE - PROGRESS NOTE DETAILS
AG Pgy3: Patient was evaluated by social work. patient is declining transfer to detox facility at this time. States that he wants to go home. patient is well appearing and has no signs of clinical withdrawal. VSS. will d/c home with strict return precautions and close f/u. he is leaving with parents ATTG: : patient endorsed to me by Dr Enrique. awaiting SW eval. patient is considering going to an inpatient rehab for etoh. family at the bedside.

## 2019-12-07 NOTE — ED PROVIDER NOTE - NSFOLLOWUPINSTRUCTIONS_ED_ALL_ED_FT
Please follow up with the resources that you were given by social work. Please return to the ER if you develop vomiting, anxiety, tremors, seizures, hallucinations, or if you have any other concerns.

## 2019-12-07 NOTE — SBIRT NOTE ADULT - NSSBIRTDRGNAMECONTACT_GEN_A_CORE
02/08/19 0841   Provider Notification   Provider Name/Title Dr. Quinn   Method of Notification At Bedside   Dr. Quinn at bedside. MD updated on insulin pump malfunction as of last night. Pt spoke with her endocrinologist last evening for insulin dosing last night and this AM. Pt has only had coffee with creamer this AM. Updated on BG of 186 on admission to hospital. Orders from Dr. Quinn to start insulin GTT per protocol and check hourly glucoses, plan for IV PCN for GBS positive status and NKDA, plan for Pitocin per protocol (SVE by MD 3/50/-2, posterior with rogel score of 7). Intrapartum orders placed by MD and reviewed with RN. MD orders for hospitalist consult as well. MD reviewed strip at bedside--FHT's with minimal variability with accelerations (no decelerations at this time). Occasional ctx on monitor that pt is not feeling. POC reviewed with pt and SO, Wilber. All questions answered.   You

## 2019-12-07 NOTE — ED PROVIDER NOTE - ATTENDING CONTRIBUTION TO CARE
Attending MD Heidi Enrique:  I personally have seen and examined this patient.  Resident note reviewed and agree on plan of care and except where noted.  See HPI, PE, and MDM for details.

## 2019-12-07 NOTE — ED PROVIDER NOTE - PATIENT PORTAL LINK FT
You can access the FollowMyHealth Patient Portal offered by [facility] by registering at the following website: [email or web address]. By joining [employer]’s FollowMyHealth portal, you will also be able to view your health information using other applications (apps) compatible with our system.

## 2019-12-07 NOTE — ED PROVIDER NOTE - NS ED ROS FT
REVIEW OF SYSTEMS:  General:  no fever, no chills  HEENT: no headache, no vision changes  Cardiac: no chest pain, no palpitations  Respiratory: no cough, no shortness of breath  Gastrointestinal: no abdominal pain, no nausea, no vomiting, no diarrhea  Genitourinary: no hematuria, no dysuria, no urinary frequency  Extremities: no extremity swelling, +R hand pain  Neuro: no focal weakness, no numbness/tingling of the extremities, no decreased sensation  Heme: no easy bleeding, no easy bruising  Skin: no jaundice,  no rashes, no lesions  All other ROS as documented in HPI  -Alfredo Garcia, PGY-2

## 2019-12-07 NOTE — SBIRT NOTE ADULT - NSSBIRTALCACTIVEREFTXDET_GEN_A_CORE
Pt in agreement with voluntary inpatient rehabilitation. Pt referred and going to treatment upon d/c.
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
## 2019-12-07 NOTE — ED PROVIDER NOTE - PHYSICAL EXAMINATION
General: Well developed, well nourished  HEENT: Normocephalic and atraumatic  Cardiac: Normal S1 and S2  Pulmonary: No increased WOB.   Abdominal: Soft, NTND  Neurologic: No focal sensory or motor deficits.  Musculoskeletal: R hand has small abrasion over thenar eminence. TTP. No limited ROM.  Vascular: Warm and well perfused  Skin: Color appropriate for race.   Psychiatric: Mildly intoxicated. Appropriate mood and affect. No apparent risk to self or others.  Alfredo Garcia M.D. PGY-2 General: Well developed, well nourished  HEENT: Normocephalic and atraumatic  Cardiac: Normal S1 and S2  Pulmonary: No increased WOB.   Abdominal: Soft, NTND  Neurologic: No focal sensory or motor deficits.  Musculoskeletal: R hand has small abrasion over thenar eminence. TTP. No snuffbox tenderness, no pain with axial loading of thumb. No limited ROM.  Vascular: Warm and well perfused  Skin: Color appropriate for race.   Psychiatric: Mildly intoxicated. Appropriate mood and affect. No apparent risk to self or others.  Alfredo Garcia M.D. PGY-2 General: Well developed, well nourished  HEENT: Normocephalic and atraumatic  Cardiac: Normal S1 and S2  Pulmonary: No increased WOB.   Abdominal: Soft, NTND  Neurologic: No focal sensory or motor deficits.  Musculoskeletal: R hand has small abrasion over thenar eminence. TTP. No snuffbox tenderness, no pain with axial loading of thumb. No limited ROM.  Vascular: Warm and well perfused  Skin: Color appropriate for race.   Psychiatric: Mildly intoxicated. Appropriate mood and affect. No apparent risk to self or others.  Alfredo Garcia M.D. PGY-2  Attending Heidi Enrique: Gen: NAD, heent: atrauamtic, eomi, perrla, mmm, op pink, uvula midline, neck; nttp, no nuchal rigidity, chest: nttp, no crepitus, cv: rrr, no murmurs, lungs: ctab, abd: soft, nontender, nondistended, no peritoneal signs, +BS, no guarding, ext: wwp, neg homans, skin: amall abrasion of thenar eminence. no snuffbox ttp. full rom neuro: awake and alert, following commands, speech clear, sensation and strength intact, no focal deficits, no fasiculations, no tremors, psych: no si or hi

## 2019-12-09 ENCOUNTER — EMERGENCY (EMERGENCY)
Facility: HOSPITAL | Age: 24
LOS: 1 days | Discharge: HOME | End: 2019-12-09
Attending: EMERGENCY MEDICINE | Admitting: EMERGENCY MEDICINE
Payer: COMMERCIAL

## 2019-12-09 VITALS
SYSTOLIC BLOOD PRESSURE: 160 MMHG | DIASTOLIC BLOOD PRESSURE: 107 MMHG | OXYGEN SATURATION: 100 % | HEART RATE: 116 BPM | TEMPERATURE: 98 F | HEIGHT: 66 IN | RESPIRATION RATE: 14 BRPM

## 2019-12-09 VITALS
RESPIRATION RATE: 16 BRPM | HEART RATE: 95 BPM | OXYGEN SATURATION: 97 % | SYSTOLIC BLOOD PRESSURE: 147 MMHG | DIASTOLIC BLOOD PRESSURE: 87 MMHG

## 2019-12-09 LAB
ALBUMIN SERPL ELPH-MCNC: 3.9 G/DL — SIGNIFICANT CHANGE UP (ref 3.3–5)
ALP SERPL-CCNC: 89 U/L — SIGNIFICANT CHANGE UP (ref 40–120)
ALT FLD-CCNC: 28 U/L — SIGNIFICANT CHANGE UP (ref 4–41)
ANION GAP SERPL CALC-SCNC: 12 MMO/L — SIGNIFICANT CHANGE UP (ref 7–14)
AST SERPL-CCNC: 19 U/L — SIGNIFICANT CHANGE UP (ref 4–40)
BASOPHILS # BLD AUTO: 0.08 K/UL — SIGNIFICANT CHANGE UP (ref 0–0.2)
BASOPHILS NFR BLD AUTO: 0.6 % — SIGNIFICANT CHANGE UP (ref 0–2)
BILIRUB SERPL-MCNC: 0.2 MG/DL — SIGNIFICANT CHANGE UP (ref 0.2–1.2)
BLD GP AB SCN SERPL QL: NEGATIVE — SIGNIFICANT CHANGE UP
BUN SERPL-MCNC: 5 MG/DL — LOW (ref 7–23)
CALCIUM SERPL-MCNC: 9 MG/DL — SIGNIFICANT CHANGE UP (ref 8.4–10.5)
CHLORIDE SERPL-SCNC: 101 MMOL/L — SIGNIFICANT CHANGE UP (ref 98–107)
CO2 SERPL-SCNC: 27 MMOL/L — SIGNIFICANT CHANGE UP (ref 22–31)
CREAT SERPL-MCNC: 0.79 MG/DL — SIGNIFICANT CHANGE UP (ref 0.5–1.3)
EOSINOPHIL # BLD AUTO: 0.23 K/UL — SIGNIFICANT CHANGE UP (ref 0–0.5)
EOSINOPHIL NFR BLD AUTO: 1.8 % — SIGNIFICANT CHANGE UP (ref 0–6)
GLUCOSE SERPL-MCNC: 102 MG/DL — HIGH (ref 70–99)
HCT VFR BLD CALC: 48.2 % — SIGNIFICANT CHANGE UP (ref 39–50)
HGB BLD-MCNC: 15.6 G/DL — SIGNIFICANT CHANGE UP (ref 13–17)
IMM GRANULOCYTES NFR BLD AUTO: 0.5 % — SIGNIFICANT CHANGE UP (ref 0–1.5)
LIDOCAIN IGE QN: 26 U/L — SIGNIFICANT CHANGE UP (ref 7–60)
LYMPHOCYTES # BLD AUTO: 27.1 % — SIGNIFICANT CHANGE UP (ref 13–44)
LYMPHOCYTES # BLD AUTO: 3.48 K/UL — HIGH (ref 1–3.3)
MCHC RBC-ENTMCNC: 29.1 PG — SIGNIFICANT CHANGE UP (ref 27–34)
MCHC RBC-ENTMCNC: 32.4 % — SIGNIFICANT CHANGE UP (ref 32–36)
MCV RBC AUTO: 89.8 FL — SIGNIFICANT CHANGE UP (ref 80–100)
MONOCYTES # BLD AUTO: 0.96 K/UL — HIGH (ref 0–0.9)
MONOCYTES NFR BLD AUTO: 7.5 % — SIGNIFICANT CHANGE UP (ref 2–14)
NEUTROPHILS # BLD AUTO: 8.01 K/UL — HIGH (ref 1.8–7.4)
NEUTROPHILS NFR BLD AUTO: 62.5 % — SIGNIFICANT CHANGE UP (ref 43–77)
NRBC # FLD: 0 K/UL — SIGNIFICANT CHANGE UP (ref 0–0)
PLATELET # BLD AUTO: 281 K/UL — SIGNIFICANT CHANGE UP (ref 150–400)
PMV BLD: 10.1 FL — SIGNIFICANT CHANGE UP (ref 7–13)
POTASSIUM SERPL-MCNC: 3.7 MMOL/L — SIGNIFICANT CHANGE UP (ref 3.5–5.3)
POTASSIUM SERPL-SCNC: 3.7 MMOL/L — SIGNIFICANT CHANGE UP (ref 3.5–5.3)
PROT SERPL-MCNC: 7.4 G/DL — SIGNIFICANT CHANGE UP (ref 6–8.3)
RBC # BLD: 5.37 M/UL — SIGNIFICANT CHANGE UP (ref 4.2–5.8)
RBC # FLD: 14.6 % — HIGH (ref 10.3–14.5)
RH IG SCN BLD-IMP: POSITIVE — SIGNIFICANT CHANGE UP
SODIUM SERPL-SCNC: 140 MMOL/L — SIGNIFICANT CHANGE UP (ref 135–145)
WBC # BLD: 12.82 K/UL — HIGH (ref 3.8–10.5)
WBC # FLD AUTO: 12.82 K/UL — HIGH (ref 3.8–10.5)

## 2019-12-09 PROCEDURE — 99283 EMERGENCY DEPT VISIT LOW MDM: CPT

## 2019-12-09 RX ORDER — THIAMINE MONONITRATE (VIT B1) 100 MG
100 TABLET ORAL ONCE
Refills: 0 | Status: COMPLETED | OUTPATIENT
Start: 2019-12-09 | End: 2019-12-09

## 2019-12-09 RX ORDER — SODIUM CHLORIDE 9 MG/ML
2000 INJECTION INTRAMUSCULAR; INTRAVENOUS; SUBCUTANEOUS ONCE
Refills: 0 | Status: COMPLETED | OUTPATIENT
Start: 2019-12-09 | End: 2019-12-09

## 2019-12-09 RX ADMIN — SODIUM CHLORIDE 2000 MILLILITER(S): 9 INJECTION INTRAMUSCULAR; INTRAVENOUS; SUBCUTANEOUS at 05:55

## 2019-12-09 RX ADMIN — Medication 50 MILLIGRAM(S): at 06:43

## 2019-12-09 RX ADMIN — Medication 100 MILLIGRAM(S): at 06:43

## 2019-12-09 NOTE — ED ADULT TRIAGE NOTE - CHIEF COMPLAINT QUOTE
Patient arrives with parent to ED - miguelangel, +AOB, endorsing heavy EtOH use today, states he drank 1L Whiskey, Mikes Hard, and multiple other alcoholic beverages today, last intake just prior to arrival.  Denies drug use today, but hx cocaine use.  Just discharged yesterday from [place] for the same.  States he drinks every day and has had withdrawal symptoms previously, denies seizures. Patient denies SI/HI, currently cooperative in triage. Patient arrives with parent to ED - unsteady gait, +AOB, endorsing heavy EtOH use today, states he drank 1L Whiskey, Mikes Hard, and multiple other alcoholic beverages today, last intake just prior to arrival.  Denies drug use today, but hx cocaine use.  Just discharged yesterday from [place] for the same.  States he drinks every day and has had withdrawal symptoms previously, denies seizures. Patient denies SI/HI, currently cooperative in triage. Patient arrives with parent to ED - unsteady gait, +AOB, endorsing heavy EtOH use today, states he drank 1L Whiskey, Mikes Hard, and multiple other alcoholic beverages today, last intake just prior to arrival.  Denies drug use today, but hx cocaine use.  Just discharged yesterday from [place] for the same.  States he drinks every day and has had withdrawal symptoms previously, denies seizures. Patient denies SI/HI, currently cooperative in triage. At 0400, pt. attempted to leave ambulance. Escorted back to ambulance bay by ED staff. CN aware. ED attending evaluated pt.

## 2019-12-09 NOTE — ED ADULT NURSE NOTE - OBJECTIVE STATEMENT
pt brought into room 20 A&ox4 ambulatory self care male at baseline presents to the ed today for intoxication and bright red blood per rectum.  Patient unsure the amount he drank tonight but per triage patient drank 1L Whiskey, Mikes Hard, and multiple other alcoholic beverages today, last intake right before arrival. patient hx cocaine abuse. daily drinker and has had withdrawal symptoms in past. patient hypertensive and tachycardiac. sinus tach on cardiac monitor. 16G iv placed in right forearm.

## 2019-12-09 NOTE — ED PROVIDER NOTE - NSFOLLOWUPCLINICS_GEN_ALL_ED_FT
[facility]  Gastroenterology  [address] [place] [id number]  Phone: [phone]  Fax:   Follow Up Time:

## 2019-12-09 NOTE — ED PROVIDER NOTE - PATIENT PORTAL LINK FT
You can access the FollowMyHealth Patient Portal offered by [facility] by registering at the following website: [email or web address]. By joining [employer]’s FollowMyHealth portal, you will also be able to view your health information using other applications (apps) compatible with our system.

## 2019-12-09 NOTE — ED PROVIDER NOTE - CLINICAL SUMMARY MEDICAL DECISION MAKING FREE TEXT BOX
s/p etoh abuse now endorsing 1 episode BRBPR. Exam nonfocal abd soft nonttp. Labs, r/o anemia, monitor and reassess

## 2019-12-09 NOTE — ED ADULT NURSE NOTE - NSIMPLEMENTINTERV_GEN_ALL_ED
Implemented All Fall Risk Interventions:  [place] to call system. Call bell, personal items and telephone within reach. Instruct patient to call for assistance. Room bathroom lighting operational. Non-slip footwear when patient is off stretcher. Physically safe environment: no spills, clutter or unnecessary equipment. Stretcher in lowest position, wheels locked, appropriate side rails in place. Provide visual cue, wrist band, yellow gown, etc. Monitor gait and stability. Monitor for mental status changes and reorient to person, place, and time. Review medications for side effects contributing to fall risk. Reinforce activity limits and safety measures with patient and family.

## 2019-12-09 NOTE — ED PROVIDER NOTE - NSFOLLOWUPINSTRUCTIONS_ED_ALL_ED_FT
- Follow up with gastroenterology this week    - Lab and imaging results, if performed, were discussed with you along with your discharge diagnosis    - Follow up with your doctor in 1 week - bring copies of your results if you were given. If you do not have a primary doctor, please call [phone] to find one convenient for you    - Return to the ED for any new, worsening, or concerning symptoms to you    - Continue all prescribed medications    - Take ibuprofen/tylenol as directed as needed for pain    - Rest and keep yourself hydrated with fluids

## 2019-12-09 NOTE — ED ADULT NURSE NOTE - CHIEF COMPLAINT QUOTE
Patient arrives with parent to ED - unsteady gait, +AOB, endorsing heavy EtOH use today, states he drank 1L Whiskey, Mikes Hard, and multiple other alcoholic beverages today, last intake just prior to arrival.  Denies drug use today, but hx cocaine use.  Just discharged yesterday from [facility] for the same.  States he drinks every day and has had withdrawal symptoms previously, denies seizures. Patient denies SI/HI, currently cooperative in triage. At 0400, pt. attempted to leave ambulance. Escorted back to ambulance bay by ED staff. CN aware. ED attending evaluated pt.

## 2019-12-09 NOTE — ED PROVIDER NOTE - NS ED ROS FT
CONSTITUTIONAL: No fevers, no chills, no lightheadedness, no dizziness  Eyes: no visual changes  Ears: no ear drainage, no ear pain  Nose: no nasal congestion  Mouth/Throat: no sore throat  CV: No chest pain, no palpitations  PULM: No SOB, no cough  GI: No n/v/d, no abd pain  : no dysuria, no hematuria  SKIN: no rashes.  NEURO: no headache, no focal weakness or numbness  PSYCHIATRIC: +etoh abuse

## 2019-12-09 NOTE — ED PROVIDER NOTE - ATTENDING CONTRIBUTION TO CARE
23 y/o M with h/o substance abuse, ETOH abuse with mult previous ED visits (including this past week) and hospitalization for ETOH withdrawal.  Pt is BIB mother for evaluation of ETOH intoxication.  Pt reports drinking "a lot tonight" but unable to quantify.  He states "I'm an alcohol addict, I'm a drug addict, I'm a sex addict."  Pt noted to be mildly agitated and attempted to elope from ambulance bay earlier.  Now more calm and cooperative.  He denies fall or injury.  Denies drug use tonight.  No fever, cp, abd pain, n/v/d, but c/o occasional "blood when I wipe"  No melena or dark stools.  Well appearing and disheveled, lying comfortably in stretcher, awake and alert, nontoxic.  VSS.  NCAT EOMI PERRL.  Neck is supple, no midline tenderness.  Lungs cta bl.  Cards nl S1/S2, RRR, no MRG.  Abd soft ntnd.  Pelvis stable, all extremities intact, no gross deformities, and gait stable.  Given h/o etoh abuse, will check h/h, pt is hd stable if no acute anemia will plan to refer for outpatient GI follow-up, pt counseled on etoh use, declines detox, will give thiamine/mvt/librium, reassess.

## 2019-12-09 NOTE — ED PROVIDER NOTE - OBJECTIVE STATEMENT
23yo M hx etoh abuse and cocaine use accompanied by mother for etoh intox. Admits to drinking  "a lot" tonight but denies any drug use. States that he fell asleep on the floor and that PTA had 1 episode BRBPR w/o any pain. No hx of similar sx Denies any pain.

## 2019-12-09 NOTE — ED PROVIDER NOTE - PHYSICAL EXAMINATION
Gen: mildly intoxicated appearing, NAD  Head: NCAT  HEENT: PERRL, MMM, normal conjunctiva, anicteric, neck supple  Lung: CTAB, no adventitious sounds  CV: RRR, no murmurs  Abd: soft, NTND, no rebound or guarding, no CVAT  MSK: No edema, no visible deformities  Neuro: Moving all extremity grossly  Skin: Warm and dry, no evidence of rash  Psych: normal mood and affect

## 2020-01-01 NOTE — ED PROVIDER NOTE - PSYCHIATRIC, MLM
Alert and oriented to person, place, time/situation. normal mood and affect. no apparent risk to self or others. none

## 2020-02-06 ENCOUNTER — EMERGENCY (EMERGENCY)
Facility: HOSPITAL | Age: 25
LOS: 1 days | Discharge: HOME | End: 2020-02-06
Attending: EMERGENCY MEDICINE
Payer: COMMERCIAL

## 2020-02-06 VITALS
SYSTOLIC BLOOD PRESSURE: 104 MMHG | TEMPERATURE: 98 F | DIASTOLIC BLOOD PRESSURE: 69 MMHG | HEART RATE: 60 BPM | RESPIRATION RATE: 17 BRPM | OXYGEN SATURATION: 98 %

## 2020-02-06 VITALS
HEIGHT: 68 IN | TEMPERATURE: 98 F | DIASTOLIC BLOOD PRESSURE: 85 MMHG | HEART RATE: 82 BPM | OXYGEN SATURATION: 98 % | SYSTOLIC BLOOD PRESSURE: 128 MMHG | RESPIRATION RATE: 18 BRPM | WEIGHT: 143.96 LBS

## 2020-02-06 LAB
ALBUMIN SERPL ELPH-MCNC: 4 G/DL — SIGNIFICANT CHANGE UP (ref 3.3–5)
ALP SERPL-CCNC: 70 U/L — SIGNIFICANT CHANGE UP (ref 40–120)
ALT FLD-CCNC: 13 U/L — SIGNIFICANT CHANGE UP (ref 10–45)
ANION GAP SERPL CALC-SCNC: 15 MMOL/L — SIGNIFICANT CHANGE UP (ref 5–17)
AST SERPL-CCNC: 25 U/L — SIGNIFICANT CHANGE UP (ref 10–40)
BILIRUB SERPL-MCNC: 0.2 MG/DL — SIGNIFICANT CHANGE UP (ref 0.2–1.2)
BUN SERPL-MCNC: 7 MG/DL — SIGNIFICANT CHANGE UP (ref 7–23)
CALCIUM SERPL-MCNC: 9.5 MG/DL — SIGNIFICANT CHANGE UP (ref 8.4–10.5)
CHLORIDE SERPL-SCNC: 100 MMOL/L — SIGNIFICANT CHANGE UP (ref 96–108)
CO2 SERPL-SCNC: 24 MMOL/L — SIGNIFICANT CHANGE UP (ref 22–31)
CREAT SERPL-MCNC: 0.87 MG/DL — SIGNIFICANT CHANGE UP (ref 0.5–1.3)
GLUCOSE SERPL-MCNC: 85 MG/DL — SIGNIFICANT CHANGE UP (ref 70–99)
HIV 1 & 2 AB SERPL IA.RAPID: SIGNIFICANT CHANGE UP
POTASSIUM SERPL-MCNC: 3.7 MMOL/L — SIGNIFICANT CHANGE UP (ref 3.5–5.3)
POTASSIUM SERPL-SCNC: 3.7 MMOL/L — SIGNIFICANT CHANGE UP (ref 3.5–5.3)
PROT SERPL-MCNC: 7.3 G/DL — SIGNIFICANT CHANGE UP (ref 6–8.3)
SODIUM SERPL-SCNC: 139 MMOL/L — SIGNIFICANT CHANGE UP (ref 135–145)

## 2020-02-06 PROCEDURE — 80053 COMPREHEN METABOLIC PANEL: CPT

## 2020-02-06 PROCEDURE — 99283 EMERGENCY DEPT VISIT LOW MDM: CPT

## 2020-02-06 PROCEDURE — 83735 ASSAY OF MAGNESIUM: CPT

## 2020-02-06 PROCEDURE — 80307 DRUG TEST PRSMV CHEM ANLYZR: CPT

## 2020-02-06 PROCEDURE — 86703 HIV-1/HIV-2 1 RESULT ANTBDY: CPT

## 2020-02-06 PROCEDURE — 99284 EMERGENCY DEPT VISIT MOD MDM: CPT

## 2020-02-06 NOTE — ED ADULT NURSE NOTE - OBJECTIVE STATEMENT
23 y/o male history of drug abuse presents to the ED from home c/o bilateral numbness to the lower extremities. Patient states that he started to have bilateral lower extremity numbness that started today. Denies any other symptoms. Denies sick contacts at home. Patient recently traveled to [facility] 7 days. Of note, patient has been sober 41 days. Denies fever, chills, n/v, weakness, abd pain, diarrhea/constipation,  urinary s/s. Patient A&Ox3, in no respiratory distress, and denies chest pain. Equal strength in all 4 extremities. Strong peripheral pulses.

## 2020-02-06 NOTE — ED PROVIDER NOTE - ATTENDING CONTRIBUTION TO CARE
MD Carrizales:  patient seen and evaluated personally.   I agree with the History & Physical,  Impression & Plan other than what was detailed in my note.  MD Carrizales    see mdm MD Carrizales:  patient seen and evaluated personally.   I agree with the History & Physical,  Impression & Plan other than what was detailed in my note.  MD Carrizales    see mdm.

## 2020-02-06 NOTE — ED PROVIDER NOTE - OBJECTIVE STATEMENT
24M, h.o Etoh, cocaine, benzo use, no pmh, p.w acute localized paresthesia (like sharp needle stabbing sensation) of the anterior thigh and the dorsal feet b/l. Pt reports symptom started after walking for hours in the rain because he usually just "does it ... you know?". Denied SI, HI, chest pain, shortness of breath, n/v, vision changes. Reports "clean" from etoh, drugs for 49 days.

## 2020-02-06 NOTE — ED PROVIDER NOTE - CLINICAL SUMMARY MEDICAL DECISION MAKING FREE TEXT BOX
Attending Sofie:  Pt presents to ed w/ cc of "pins and needles" in lower extrem, some cramping sensation b/l, in b/l thighs and heels. pt has poor diet. hx of ivda no recent use. has not had this before. just "wanted to get checked out". no f/c, n/v ,diarrhea, no weakness. afebrile vitals stable, non toxic, well appeairng, neuro exam benign. power  5/5x4, no cn deficit 2-xii, no cerebellar symptoms. plan for cmp, mag to check electrolytes. consented hiv

## 2020-02-06 NOTE — ED ADULT NURSE NOTE - CHPI ED NUR SYMPTOMS NEG
no blurred vision/no confusion/no weakness/no change in level of consciousness/no dizziness/no fever/no loss of consciousness/no nausea/no vomiting

## 2020-02-06 NOTE — ED PROVIDER NOTE - PATIENT PORTAL LINK FT
You can access the FollowMyHealth Patient Portal offered by [facility] by registering at the following website: [email or web address]. By joining [email or web address]’s FollowMyHealth portal, you will also be able to view your health information using other applications (apps) compatible with our system.

## 2020-02-06 NOTE — ED PROVIDER NOTE - PHYSICAL EXAMINATION
General: NAD, good hygiene, well developed  HENT: Atraumatic, EOMI, no conjunctivae injection, moist mucosa   Neck: normal ROM and trachea midline   Cardiovascular: RRR, S1&2, no M or R, radial pulses equal and b/l  Respiratory: CTABL, no wheezes or crackles, no decreased breath sounds  Abdominal: soft and non-tender non distended, neg for guarding, de leon's sign, rovsing's sign, mcburney's sign, no CVA tenderness   Extremities: parethesia of the 8cm area anterior mid thigh b/l and dorsal aspect of the feet, no edema of the legs/feet, DP/PT equal b/l  Skin: no change in the skin in the LE and the dorsal aspect of the foot, warm, well perfused  Neurologic: nonfocal, AAOx3  Psych: normal mood and affect

## 2020-02-06 NOTE — ED ADULT NURSE NOTE - NSIMPLEMENTINTERV_GEN_ALL_ED
Implemented All Universal Safety Interventions:  [place] to call system. Call bell, personal items and telephone within reach. Instruct patient to call for assistance. Room bathroom lighting operational. Non-slip footwear when patient is off stretcher. Physically safe environment: no spills, clutter or unnecessary equipment. Stretcher in lowest position, wheels locked, appropriate side rails in place.

## 2020-02-07 ENCOUNTER — EMERGENCY (EMERGENCY)
Facility: HOSPITAL | Age: 25
LOS: 1 days | End: 2020-02-07
Attending: EMERGENCY MEDICINE
Payer: COMMERCIAL

## 2020-02-07 VITALS
OXYGEN SATURATION: 98 % | HEIGHT: 68 IN | DIASTOLIC BLOOD PRESSURE: 89 MMHG | TEMPERATURE: 98 F | SYSTOLIC BLOOD PRESSURE: 132 MMHG | HEART RATE: 94 BPM | RESPIRATION RATE: 16 BRPM

## 2020-02-07 VITALS
RESPIRATION RATE: 16 BRPM | TEMPERATURE: 98 F | SYSTOLIC BLOOD PRESSURE: 133 MMHG | HEART RATE: 86 BPM | DIASTOLIC BLOOD PRESSURE: 73 MMHG | OXYGEN SATURATION: 98 %

## 2020-02-07 PROCEDURE — 99284 EMERGENCY DEPT VISIT MOD MDM: CPT

## 2020-02-07 PROCEDURE — 99283 EMERGENCY DEPT VISIT LOW MDM: CPT

## 2020-02-07 NOTE — ED PROVIDER NOTE - PATIENT PORTAL LINK FT
You can access the FollowMyHealth Patient Portal offered by [facility] by registering at the following website: [email or web address]. By joining [email or web address]’s [email or web address] portal, you will also be able to view your health information using other applications (apps) compatible with our system.

## 2020-02-07 NOTE — ED PROVIDER NOTE - ATTENDING CONTRIBUTION TO CARE
MD Carrizales:  patient seen and evaluated personally.   I agree with the History & Physical,  Impression & Plan other than what was detailed in my note.  MD Carrizales  Pt w/ hx of drug abuse, etoh use, seen by me last night for paresthesias in legs which he states are now better. was brought in because his mom called 911 and wanted him "checked out". Pt denies si/hi/hallucinations, resident called mother who stated she did not believe pt wanted to harm himself. pt states had argument with mother when she came over unannoucned. denies drug use and clean for several weeks. Pt stable for dc. On exam pt is pleasant cooperative, w/ no signs of intoxication or self harm
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
## 2020-02-07 NOTE — ED PROVIDER NOTE - CLINICAL SUMMARY MEDICAL DECISION MAKING FREE TEXT BOX
24 y m brought by EMS for possible SI however after speaking with mother and pts sponsor pt is not suicidal and has never been. Pt is safe has good social support with his girlfriend and sponsor, will DC pt home.

## 2020-07-29 NOTE — SBIRT NOTE ADULT - NSSBIRTDRGUSEDOTHTHAN_GEN_A_CORE
ISSUE: Creatinine results 3.2 on 7/29/20. Baseline 2.3-2.6 per last appointment     PLAN:  How are you feeling?  Any recent illness/fever?  Any new or missed medications?  Constipated (pancreas)?  Are you drinking 2-3L water/day?  Volume status/weight/edema?  Changes in UOP? Color?  Pain over graft sites?    Hydrate and repeat labs next week; order placed.    OUTCOME:  [employer] message sent to Miguel Angel after leaving VM message.   Yes

## 2020-10-06 NOTE — ED ADULT NURSE NOTE - NSFALLRSKINDICATORS_ED_ALL_ED
Patient ID: Tracy Loredo is a 63 year old female.    Chief Complaint   Patient presents with   • Counseling     medication        HPI: Patient here for medication refill: insurance not paying for medications except if they are prescribed by PCP. Patient not finding Psychiatrist in network, working with Cassie to find one.      Case number, insurance [id number] Cassie    Past Medical History:   Diagnosis Date   • Bipolar 1 disorder (CMS/HCC)    • Depression    • Hyperlipemia    • Hypertension    • IBS (irritable bowel syndrome)    • Migraine      Past Surgical History:   Procedure Laterality Date   • Hemorrhoidectomy     • Plantar fascia surgery Bilateral      Social History     Socioeconomic History   • Marital status: /Civil Union     Spouse name: Not on file   • Number of children: Not on file   • Years of education: Not on file   • Highest education level: Not on file   Occupational History   • Not on file   Social Needs   • Financial resource strain: Not on file   • Food insecurity     Worry: Not on file     Inability: Not on file   • Transportation needs     Medical: Not on file     Non-medical: Not on file   Tobacco Use   • Smoking status: Never Smoker   • Smokeless tobacco: Never Used   Substance and Sexual Activity   • Alcohol use: Never     Frequency: Never   • Drug use: Never   • Sexual activity: Not Currently   Lifestyle   • Physical activity     Days per week: Not on file     Minutes per session: Not on file   • Stress: Not on file   Relationships   • Social connections     Talks on phone: Not on file     Gets together: Not on file     Attends Sabianist service: Not on file     Active member of club or organization: Not on file     Attends meetings of clubs or organizations: Not on file     Relationship status: Not on file   • Intimate partner violence     Fear of current or ex partner: Not on file     Emotionally abused: Not on file     Physically abused: Not on file     Forced sexual  activity: Not on file   Other Topics Concern   • Not on file   Social History Narrative   • Not on file     Family History   Problem Relation Age of Onset   • Cancer Mother    • Stroke Father      Current Outpatient Medications   Medication Sig Dispense Refill   • QUEtiapine (SEROquel) 300 MG tablet Take 1 tablet by mouth nightly. 30 tablet 0   • QUEtiapine (SEROquel) 50 MG tablet Take 1 tablet by mouth 2 times daily. 60 tablet 0   • QUEtiapine (SEROquel) 25 MG tablet Take 1 tablet by mouth nightly. 30 tablet 0   • FLUoxetine (PROzac) 20 MG capsule Take 1 capsule by mouth daily. 90 capsule 0   • mirtazapine (REMERON) 30 MG tablet Take 2 tablets by mouth nightly. 180 tablet 0   • OXcarbazepine (TRILEPTAL) 300 MG tablet Take 1 tablet by mouth 2 times daily. 180 tablet 0   • clonazePAM (KlonoPIN) 2 MG tablet Take 1 tablet by mouth 3 times daily as needed for Anxiety. 90 tablet 0   • linaclotide (Linzess) 72 MCG Cap      • lidocaine (LIDODERM) 5 % patch Place 1 patch onto the skin every 24 hours. Remove patch 12 hours after applying     • atenolol (TENORMIN) 100 MG tablet TAKE 1 TABLET BY MOUTH EVERY DAY 90 tablet 1   • HYDROcodone-acetaminophen (NORCO)  MG per tablet Take 1 tablet by mouth every 8 hours as needed for Pain. Opioid, keep medications safe and protected, use as prescribed, no alcohol consumption. Might cause confusion. 75 tablet 0   • Eszopiclone 3 MG tablet Take 1 tablet by mouth daily. 30 tablet 0   • pantoprazole (PROTONIX) 40 MG tablet pantoprazole 40 mg tablet,delayed release     • dicyclomine (BENTYL) 10 MG capsule 20 mg.      • ondansetron (Zofran) 4 MG tablet Take 1 tablet by mouth every 8 hours as needed for Nausea. 20 tablet 0   • simvastatin (ZOCOR) 20 MG tablet Take 1 tablet by mouth every evening. 90 tablet 0   • omeprazole (PRILOSEC) 40 MG capsule TAKE 1 CAPSULE BY MOUTH DAILY 90 capsule 0     No current facility-administered medications for this visit.      ALLERGIES:   Allergen  Reactions   • Ambien Other (See Comments)   • Benadryl Allergy Palpitations   • Cariprazine Other (See Comments)     Aparna symptoms    • Cefprozil HIVES   • Cefzil HIVES   • Cephalosporins CARDIAC DISTURBANCES   • Latex RASH   • Levaquin Palpitations   • Levofloxacin Palpitations     Cerner Allergy Text Annotation: Levaquin     • Nickel PRURITUS   • Phenobarbital HIVES   • Sulfa Antibiotics HIVES     Cerner Allergy Text Annotation: sulfa drugs   • Sumatriptan Palpitations     severe chest symptoms       Review of Systems   Constitutional: Negative.    HENT: Negative.    Eyes: Negative.    Respiratory: Negative.    Cardiovascular: Negative.    Gastrointestinal: Negative.    Endocrine: Negative.    Genitourinary: Negative.    Musculoskeletal: Negative.    Allergic/Immunologic: Negative.    Neurological: Negative.    Hematological: Negative.    Psychiatric/Behavioral: Negative.        There were no vitals taken for this visit.  Physical Exam  Vitals signs and nursing note reviewed.   Constitutional:       Appearance: She is well-developed.   HENT:      Head: Normocephalic and atraumatic.      Right Ear: External ear normal.      Left Ear: External ear normal.      Nose: Nose normal.   Eyes:      Conjunctiva/sclera: Conjunctivae normal.      Pupils: Pupils are equal, round, and reactive to light.   Neck:      Musculoskeletal: Normal range of motion and neck supple.   Cardiovascular:      Rate and Rhythm: Normal rate and regular rhythm.      Heart sounds: Normal heart sounds.   Pulmonary:      Effort: Pulmonary effort is normal.      Breath sounds: Normal breath sounds.   Abdominal:      General: Bowel sounds are normal.      Palpations: Abdomen is soft.   Musculoskeletal: Normal range of motion.   Skin:     General: Skin is warm and dry.   Neurological:      Mental Status: She is alert and oriented to person, place, and time.   Psychiatric:         Mood and Affect: Mood is anxious.         Behavior: Behavior normal.          Thought Content: Thought content normal.         Judgment: Judgment normal.         US GALLBLADDER  Narrative: EXAM: US GALLBLADDER    CLINICAL INDICATION: Right upper quadrant abdominal pain    COMPARISON: None.    FINDINGS: Diffuse increased echotexture of the liver is compatible with hepatic steatosis.  No focal mass.  There is no intrahepatic biliary ductal dilatation although the extra hepatic common bile duct is distended at 0.9 cm.      The gallbladder is distended although otherwise echolucent.  No stones or wall thickening.  There is no pericholecystic fluid.  Negative sonographic Chavez sign.    Evaluation of the pancreas is nondiagnostic due to overlying bowel gas.  Impression: 1.  Hepatic steatosis.  2.  Distended gallbladder although no findings of cholelithiasis or acute cholecystitis.  3.  Dilated extrahepatic common bile duct.  Distal CBD obstruction /stone cannot be excluded.  Correlation with liver enzymes recommended.  Further evaluation with MRCP may be warranted.    Electronically Signed by: CROW BANKS M.D.   Signed on: 8/30/2020 6:56 PM   CT ABDOMEN PELVIS W CONTRAST  Narrative: EXAM:  CT ABDOMEN PELVIS W CONTRAST    CLINICAL INDICATION: Abdominal pain     COMPARISON:  No previous exams available for review.    TECHNIQUE: Enhanced axial images of the abdomen and pelvis were obtained at 5 mm slice thickness with reformatted images provided in the coronal and sagittal planes. mA and/or kVp was adjusted for patient size.     CONTRAST: 100 mL of Omnipaque 350 was administered intravenously.    FINDINGS:      The liver demonstrates normal contour and density.  No hepatic masses are seen.  No hepatic biliary ductal dilatation.  The common bile duct is moderately dilated and measures up to 1 cm in diameter.  There is also moderate distention of the gallbladder   which measures up to 4.8 cm in transverse dimension.    The spleen, pancreas, and adrenal glands are within normal limits.  The  kidneys enhance symmetrically without evidence of hydronephrosis.  Simple cortical renal cysts are noted bilaterally.  The abdominal aorta is normal in caliber.    The bowel is without evidence of obstruction or adjacent inflammatory change.  No enlarged lymph nodes identified in the abdomen and pelvis.  There is no free fluid or intraperitoneal free air.    The uterus is present.  No adnexal masses are identified.    The visualized lung bases appear clear.    There are mild degenerative changes of the lumbar spine.  No suspicious osseous lesions are identified.  Impression: 1.  Common bile duct dilatation in addition to a moderately distended gallbladder.  Findings raise the concern for possible underlying choledocholithiasis.  Recommend clinical correlation with liver function tests.  Consider further evaluation with MRCP.  2.  Bilateral renal cysts.  3.  Other incidental findings, as detailed above.    Electronically Signed by: GABY BAUTISTA M.D.   Signed on: 8/30/2020 5:26 PM   XR CHEST PA OR AP 1 VIEW  Narrative: EXAM: XR CHEST PA OR AP 1 VIEW    CLINICAL INDICATION: Upper abdominal pain    COMPARISON: CTA of the chest from 09/21/2011    TECHNIQUE: Single portable, upright frontal view of the chest    FINDINGS:     There is stable appearance of mild scarring at the peripheral left lung base.  The lungs otherwise appear clear.  The cardiomediastinal silhouette is within normal limits.  There is no pulmonary vascular congestion, sizable pleural effusion, or   pneumothorax.  Impression: No radiographic evidence of an acute cardiopulmonary process.    Electronically Signed by: GABY BAUTISTA M.D.   Signed on: 8/30/2020 3:07 PM       No results found for this visit on 10/06/20.     Assessment/Plan:  Major depressive disorder, recurrent episode, in full remission (CMS/HCC)    Chronic low back pain with sciatica, sciatica laterality unspecified, unspecified back pain laterality    - medication refilled  - continue to  look for a Psychiatrist  New Prescriptions    QUETIAPINE (SEROQUEL) 25 MG TABLET    Take 1 tablet by mouth nightly.         Kina Park MD   no

## 2021-05-10 NOTE — ED ADULT NURSE NOTE - CAS TRG GENERAL NORM CIRC DET
Capillary refill less/equal to 2 seconds/Strong peripheral pulses
Implemented All Universal Safety Interventions:  [place] to call system. Call bell, personal items and telephone within reach. Instruct patient to call for assistance. Room bathroom lighting operational. Non-slip footwear when patient is off stretcher. Physically safe environment: no spills, clutter or unnecessary equipment. Stretcher in lowest position, wheels locked, appropriate side rails in place.

## 2021-10-13 NOTE — ED ADULT NURSE NOTE - CAS ELECT INFOMATION PROVIDED
Dialysis unit in his Snapshot is incorrect has him going to [facility]  The correct center is [facility] [address]  [place], [place] [id number] [phone]  
DC instructions

## 2022-07-20 NOTE — ED ADULT NURSE NOTE - PERIPHERAL VASCULAR
Pt discharged home with parent/guardian. Pt acting age appropriately, respirations regular and unlabored, cap refill less than two seconds. Skin pink, dry and warm. Lungs clear bilaterally. No further complaints at this time. Parent/guardian verbalized understanding of discharge paperwork and has no further questions at this time. Education provided about continuation of care, follow up care and medication administration. Parent/guardian able to provided teach back about discharge instructions.
The documentation for this period is being entered following the guidelines as defined in the [facility] policy by Sea Tian RN.
WDL

## 2023-05-19 ENCOUNTER — EMERGENCY (EMERGENCY)
Facility: HOSPITAL | Age: 28
LOS: 1 days | Discharge: HOME | End: 2023-05-19
Attending: STUDENT IN AN ORGANIZED HEALTH CARE EDUCATION/TRAINING PROGRAM | Admitting: STUDENT IN AN ORGANIZED HEALTH CARE EDUCATION/TRAINING PROGRAM
Payer: COMMERCIAL

## 2023-05-19 VITALS
RESPIRATION RATE: 18 BRPM | TEMPERATURE: 98 F | HEART RATE: 80 BPM | SYSTOLIC BLOOD PRESSURE: 153 MMHG | OXYGEN SATURATION: 100 % | DIASTOLIC BLOOD PRESSURE: 54 MMHG

## 2023-05-19 PROCEDURE — 99284 EMERGENCY DEPT VISIT MOD MDM: CPT

## 2023-05-19 RX ORDER — ACETAMINOPHEN 500 MG
975 TABLET ORAL ONCE
Refills: 0 | Status: COMPLETED | OUTPATIENT
Start: 2023-05-19 | End: 2023-05-19

## 2023-05-19 RX ORDER — METOCLOPRAMIDE HCL 10 MG
10 TABLET ORAL ONCE
Refills: 0 | Status: COMPLETED | OUTPATIENT
Start: 2023-05-19 | End: 2023-05-19

## 2023-05-19 RX ORDER — SODIUM CHLORIDE 9 MG/ML
1000 INJECTION INTRAMUSCULAR; INTRAVENOUS; SUBCUTANEOUS ONCE
Refills: 0 | Status: COMPLETED | OUTPATIENT
Start: 2023-05-19 | End: 2023-05-19

## 2023-05-19 RX ADMIN — SODIUM CHLORIDE 1000 MILLILITER(S): 9 INJECTION INTRAMUSCULAR; INTRAVENOUS; SUBCUTANEOUS at 19:21

## 2023-05-19 RX ADMIN — Medication 10 MILLIGRAM(S): at 19:17

## 2023-05-19 RX ADMIN — Medication 975 MILLIGRAM(S): at 19:17

## 2023-05-19 NOTE — ED ADULT NURSE NOTE - NSFALLUNIVINTERV_ED_ALL_ED
Bed/Stretcher in lowest position, wheels locked, appropriate side rails in place/Call bell, personal items and telephone in reach/Instruct patient to call for assistance before getting out of bed/chair/stretcher/Non-slip footwear applied when patient is off stretcher/[place] to call system/Physically safe environment - no spills, clutter or unnecessary equipment/Purposeful proactive rounding/Room/bathroom lighting operational, light cord in reach

## 2023-05-19 NOTE — ED ADULT NURSE NOTE - OBJECTIVE STATEMENT
27 yom presents A&Ox4, c/o right eye redness and tearing x1 year, states symptoms worsened over the past few days associated w/ headache. Denies any visual changes or PHx. Respirations even and unlabored. Pt denies any chest pain, sob, nausea, vomiting or fevers. No acute distress noted. pending MD tavera. Safety maintained.

## 2023-05-19 NOTE — ED PROVIDER NOTE - PHYSICAL EXAMINATION
Well-appearing.  No conjunctival injection noted.  No eyelid swelling noted.  No eyelid skin changes noted.  Pupils equal and reactive to light.  Extraocular movements intact.  Equal strength and sensation in all extremities.  Smooth gait.  Abdomen soft nontender.

## 2023-05-19 NOTE — ED PROVIDER NOTE - PATIENT PORTAL LINK FT
You can access the FollowMyHealth Patient Portal offered by [facility] by registering at the following website: [email or web address]. By joining [employer]’s FollowMyHealth portal, you will also be able to view your health information using other applications (apps) compatible with our system.

## 2023-05-19 NOTE — ED ADULT TRIAGE NOTE - CHIEF COMPLAINT QUOTE
PT c/o intermittent right eye clear watery x1 year, worsening past 2 days.  Right eye noted dry at this time.  No redness/ swelling noted.  PT c/o nausea and headache at this time.  Breathing non-labored.  Denies CP, no SOB noted.  PT arrived with Right eye patch.
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
## 2023-05-19 NOTE — ED PROVIDER NOTE - NSFOLLOWUPINSTRUCTIONS_ED_ALL_ED_FT
You can use 400-600mg Ibuprofen (such as motrin or advil) every 6 to 8 hours as needed for pain control.  Take ibuprofen with food or milk to lessen stomach upset.  This is an over-the-counter medication please respect the warnings on the label. All medications come with certain risks and side effects that you need to discuss with your doctor, especially if you are taking them for a prolonged period.    You can use 500-1000mg Tylenol every 6 hours for pain - as needed.  This is an over-the-counter medications - please respect the warnings on the label. This medication come with certain risks and side effects that you need to discuss with your doctor, especially if you are taking it for a prolonged period.    General Headache Without Cause  A headache is pain or discomfort you feel around the head or neck area. There are many causes and types of headaches. In some cases, the cause may not be found.    Follow these instructions at home:  Watch your condition for any changes. Let your doctor know about them. Take these steps to help with your condition:    Managing pain    A bag of ice on a towel on the skin.   A heating pad for use on the painful area.   Take over-the-counter and prescription medicines only as told by your doctor. This includes medicines for pain that are taken by mouth or put on the skin.  Lie down in a dark, quiet room when you have a headache.  If told, put ice on your head and neck area:  Put ice in a plastic bag.  Place a towel between your skin and the bag.  Leave the ice on for 20 minutes, 2–3 times per day.  Take off the ice if your skin turns bright red. This is very important. If you cannot feel pain, heat, or cold, you have a greater risk of damage to the area.  If told, put heat on the affected area. Use the heat source that your doctor recommends, such as a moist heat pack or a heating pad.  Place a towel between your skin and the heat source.  Leave the heat on for 20–30 minutes.  Take off the heat if your skin turns bright red. This is very important. If you cannot feel pain, heat, or cold, you have a greater risk of getting burned.  Keep lights dim if bright lights bother you or make your headaches worse.  Eating and drinking    Eat meals on a regular schedule.  If you drink alcohol:  Limit how much you have to:  0–1 drink a day for women who are not pregnant.  0–2 drinks a day for men.  Know how much alcohol is in a drink. In the [place] one drink equals one 12 oz bottle of beer (355 mL), one 5 oz glass of wine (148 mL), or one 1½ oz glass of hard liquor (44 mL).  Stop drinking caffeine, or drink less caffeine.  General instructions    A person writing in a journal.   Keep a journal to find out if certain things bring on headaches. For example, write down:  What you eat and drink.  How much sleep you get.  Any change to your diet or medicines.  Get a massage or try other ways to relax.  Limit stress.  Sit up straight. Do not tighten (tense) your muscles.  Do not smoke or use any products that contain nicotine or tobacco. If you need help quitting, ask your doctor.  Exercise regularly as told by your doctor.  Get enough sleep. This often means 7–9 hours of sleep each night.  Keep all follow-up visits. This is important.  Contact a doctor if:  Medicine does not help your symptoms.  You have a headache that feels different than the other headaches.  You feel like you may vomit (nauseous) or you vomit.  You have a fever.  Get help right away if:  Your headache:  Gets very bad quickly.  Gets worse after a lot of physical activity.  You have any of these symptoms:  You continue to vomit.  A stiff neck.  Trouble seeing.  Your eye or ear hurts.  Trouble speaking.  Weak muscles or you lose muscle control.  You lose your balance or have trouble walking.  You feel like you will pass out (faint) or you pass out.  You are mixed up (confused).  You have a seizure.  These symptoms may be an emergency. Get help right away. Call your local emergency services (911 in the [place]  Do not wait to see if the symptoms will go away.  Do not drive yourself to the hospital.  Summary  A headache is pain or discomfort that is felt around the head or neck area.  There are many causes and types of headaches. In some cases, the cause may not be found.  Keep a journal to help find out what causes your headaches. Watch your condition for any changes. Let your doctor know about them.  Contact a doctor if you have a headache that is different from usual, or if medicine does not help your headache.  Get help right away if your headache gets very bad, you throw up, you have trouble seeing, you lose your balance, or you have a seizure.

## 2023-05-19 NOTE — ED PROVIDER NOTE - CLINICAL SUMMARY MEDICAL DECISION MAKING FREE TEXT BOX
27-year-old male with no past medical history coming in with headache, nausea, right watery eye.  Vital signs are stable.  Neuro intact.  Patient defers conjunctival staining to check for scratches -patient does not use contacts, no trauma, no conjunctival injection, no discharge, no eyelid swelling or skin changes.  Patient is not comfortable with inverting eyelids.  Trialed Reglan and fluids.  Patient reports his symptoms are resolved at this time completely.  Stable for discharge.

## 2023-10-30 NOTE — PROVIDER CONTACT NOTE (OTHER) - NAME OF MD/NP/PA/DO NOTIFIED:
ATTENDING NOTE: I saw the patient with the resident physician, I reviewed the content of the resident's clinical note and agree with their findings, including assessment and plan.    HISTORY OF PRESENT ILLNESS:  Rooming chief complaint: Pre-Op Exam (Surgery with Dr Chiol Teixeira on 11-8-23, Ureteroscopy.). Doing well overall; denies cardiorespiratory symptoms. See resident note for further details.    ASSESSMENT/PLAN  Preop exam: history and physical exam, as documented in resident note, is reassuring. Recent labs reviewed and reassuring. Last ECG reviewed and reassuring.  -Discussed need for pre-operative labs; see orders. Reviewed after office visit and reassuring.  -Presents as low-risk for proposed procedure and is optimized to proceed as planned    DIAGNOSIS:  1. Preop examination    2. Left ureteral stone    3. Class 3 severe obesity without serious comorbidity with body mass index (BMI) of 45.0 to 49.9 in adult, unspecified obesity type (CMD)      Follow-up: as needed    Little Randhawa MD, MPH  Family Physician with Obstetrics  [facility]   Hugo Conteh MD

## 2024-02-03 NOTE — ED ADULT NURSE NOTE - CCCP TRG CHIEF CMPLNT
I have personally seen and examined the patient. I have collaborated with and supervised the eye discharge

## 2024-04-20 NOTE — PROVIDER CONTACT NOTE (OTHER) - SITUATION
Patient complains of cravings for drug usage. Nicotine patch in place and received prn nicotine inhalation Negative

## 2024-06-06 ENCOUNTER — APPOINTMENT (OUTPATIENT)
Dept: RADIOLOGY | Facility: CLINIC | Age: 29
End: 2024-06-06
Payer: COMMERCIAL

## 2024-06-06 ENCOUNTER — OUTPATIENT (OUTPATIENT)
Dept: OUTPATIENT SERVICES | Facility: HOSPITAL | Age: 29
LOS: 1 days | End: 2024-06-06
Payer: COMMERCIAL

## 2024-06-06 DIAGNOSIS — Z00.8 ENCOUNTER FOR OTHER GENERAL EXAMINATION: ICD-10-CM

## 2024-06-06 PROBLEM — Z00.00 ENCOUNTER FOR PREVENTIVE HEALTH EXAMINATION: Status: ACTIVE | Noted: 2024-06-06

## 2024-06-06 PROCEDURE — 71046 X-RAY EXAM CHEST 2 VIEWS: CPT | Mod: 26

## 2024-06-06 PROCEDURE — 71046 X-RAY EXAM CHEST 2 VIEWS: CPT

## 2024-07-07 ENCOUNTER — NON-APPOINTMENT (OUTPATIENT)
Age: 29
End: 2024-07-07

## 2024-11-27 ENCOUNTER — NON-APPOINTMENT (OUTPATIENT)
Age: 29
End: 2024-11-27